# Patient Record
Sex: MALE | Race: WHITE | Employment: OTHER | URBAN - METROPOLITAN AREA
[De-identification: names, ages, dates, MRNs, and addresses within clinical notes are randomized per-mention and may not be internally consistent; named-entity substitution may affect disease eponyms.]

---

## 2017-01-03 ENCOUNTER — ALLIED HEALTH/NURSE VISIT (OUTPATIENT)
Dept: NURSING | Facility: CLINIC | Age: 82
End: 2017-01-03
Payer: MEDICARE

## 2017-01-03 DIAGNOSIS — D51.8 OTHER VITAMIN B12 DEFICIENCY ANEMIA: ICD-10-CM

## 2017-01-03 PROCEDURE — 96372 THER/PROPH/DIAG INJ SC/IM: CPT

## 2017-05-08 ENCOUNTER — ALLIED HEALTH/NURSE VISIT (OUTPATIENT)
Dept: NURSING | Facility: CLINIC | Age: 82
End: 2017-05-08
Payer: MEDICARE

## 2017-05-08 DIAGNOSIS — E53.8 VITAMIN B 12 DEFICIENCY: Primary | ICD-10-CM

## 2017-05-08 PROCEDURE — 96372 THER/PROPH/DIAG INJ SC/IM: CPT

## 2017-06-16 ENCOUNTER — ALLIED HEALTH/NURSE VISIT (OUTPATIENT)
Dept: NURSING | Facility: CLINIC | Age: 82
End: 2017-06-16
Payer: MEDICARE

## 2017-06-16 DIAGNOSIS — E53.8 VITAMIN B 12 DEFICIENCY: Primary | ICD-10-CM

## 2017-06-16 PROCEDURE — 96372 THER/PROPH/DIAG INJ SC/IM: CPT

## 2017-06-16 NOTE — MR AVS SNAPSHOT
"              After Visit Summary   6/16/2017    Aleksandar Decker    MRN: [id number]           Patient Information     Date Of Birth          8/28/1926        Visit Information        Provider Department      6/16/2017 10:30 AM RI JESSICA NURSE [facility]        Today's Diagnoses     Vitamin B 12 deficiency    -  1       Follow-ups after your visit        Your next 10 appointments already scheduled     Oct 12, 2017 10:30 AM CDT   [facility]    [address] [place]  [address]  [facility] [id number] [phone]            Oct 12, 2017 10:30 AM CDT   [facility] ASHLEY Steve   [facility]    [address] [place]  [address]  [facility] [address] [phone]              Who to contact     If you have questions or need follow up information about today's clinic visit or your schedule please contact [facility] directly at [phone].  Normal or non-critical lab and imaging results will be communicated to you by OpenSparkhart, letter or phone within 4 business days after the clinic has received the results. If you do not hear from us within 7 days, please contact the clinic through OpenSparkhart or phone. If you have a critical or abnormal lab result, we will notify you by phone as soon as possible.  Submit refill requests through Criers Podium or call your pharmacy and they will forward the refill request to us. Please allow 3 business days for your refill to be completed.          Additional Information About Your Visit        MyChart Information     Criers Podium lets you send messages to your doctor, view your test results, renew your prescriptions, schedule appointments and more. To sign up, go to [email or web address] . Click on \"Log in\" on the left side of the screen, which will take " "you to the Welcome page. Then click on \"Sign up Now\" on the right side of the page.     You will be asked to enter the access code listed below, as well as some personal information. Please follow the directions to create your username and password.     Your access code is: [email or web address]  Expires: 2017  2:24 PM     Your access code will  in 90 days. If you need help or a new code, please call your [place] clinic or [phone].        Care EveryWhere ID     This is your Care EveryWhere ID. This could be used by other organizations to access your [place] medical records  [id number]         Blood Pressure from Last 3 Encounters:   16 120/64   16 140/70   16 140/70    Weight from Last 3 Encounters:   16 174 lb (78.9 kg)   16 175 lb 14.4 oz (79.8 kg)   16 175 lb 14.4 oz (79.8 kg)              Today, you had the following     No orders found for display       Primary Care Provider Office Phone # Fax #    Uday Lamar [phone]       [facility] [address] [place] [address]        Thank you!     Thank you for choosing [facility]  for your care. Our goal is always to provide you with excellent care. Hearing back from our patients is one way we can continue to improve our services. Please take a few minutes to complete the written survey that you may receive in the mail after your visit with us. Thank you!             Your Updated Medication List - Protect others around you: Learn how to safely use, store and throw away your medicines at www.disposemymeds.org.          This list is accurate as of: 17 11:46 AM.  Always use your most recent med list.                   Brand Name Dispense Instructions for use    cyanocobalamin 1000 MCG/ML injection    VITAMIN B12    1 mL    Inject 1 mL (1,000 mcg) into the muscle every 30 days       donepezil 10 MG tablet    ARICEPT    90 tablet    Take 1 tablet (10 mg) by " mouth At Bedtime       NAMENDA 10 MG tablet   Generic drug:  memantine     180 tablet    Take 10 mg by mouth daily

## 2017-07-14 ENCOUNTER — ALLIED HEALTH/NURSE VISIT (OUTPATIENT)
Dept: NURSING | Facility: CLINIC | Age: 82
End: 2017-07-14
Payer: MEDICARE

## 2017-07-14 DIAGNOSIS — D51.8 OTHER VITAMIN B12 DEFICIENCY ANEMIA: ICD-10-CM

## 2017-07-14 PROCEDURE — 96372 THER/PROPH/DIAG INJ SC/IM: CPT

## 2017-07-28 DIAGNOSIS — Z51.81 ENCOUNTER FOR MONITORING BRIDGING ANTICOAGULATION THERAPY: ICD-10-CM

## 2017-07-28 DIAGNOSIS — C44.90 SKIN CANCER: Primary | ICD-10-CM

## 2017-07-28 DIAGNOSIS — Z79.01 ENCOUNTER FOR MONITORING BRIDGING ANTICOAGULATION THERAPY: ICD-10-CM

## 2017-08-14 ENCOUNTER — ALLIED HEALTH/NURSE VISIT (OUTPATIENT)
Dept: NURSING | Facility: CLINIC | Age: 82
End: 2017-08-14
Payer: MEDICARE

## 2017-08-14 DIAGNOSIS — D51.8 OTHER VITAMIN B12 DEFICIENCY ANEMIA: ICD-10-CM

## 2017-08-14 PROCEDURE — 96372 THER/PROPH/DIAG INJ SC/IM: CPT

## 2017-08-29 ENCOUNTER — OFFICE VISIT (OUTPATIENT)
Dept: INTERNAL MEDICINE | Facility: CLINIC | Age: 82
End: 2017-08-29
Payer: MEDICARE

## 2017-08-29 VITALS
TEMPERATURE: 97.7 F | DIASTOLIC BLOOD PRESSURE: 68 MMHG | SYSTOLIC BLOOD PRESSURE: 136 MMHG | BODY MASS INDEX: 26.85 KG/M2 | OXYGEN SATURATION: 96 % | HEIGHT: 67 IN | WEIGHT: 171.1 LBS | HEART RATE: 69 BPM

## 2017-08-29 DIAGNOSIS — Z23 ENCOUNTER FOR IMMUNIZATION: ICD-10-CM

## 2017-08-29 DIAGNOSIS — E03.9 ACQUIRED HYPOTHYROIDISM: ICD-10-CM

## 2017-08-29 DIAGNOSIS — K21.9 GASTROESOPHAGEAL REFLUX DISEASE WITHOUT ESOPHAGITIS: ICD-10-CM

## 2017-08-29 DIAGNOSIS — F02.80 LATE ONSET ALZHEIMER'S DISEASE WITHOUT BEHAVIORAL DISTURBANCE (H): ICD-10-CM

## 2017-08-29 DIAGNOSIS — R25.1 TREMOR: Primary | ICD-10-CM

## 2017-08-29 DIAGNOSIS — H61.23 BILATERAL IMPACTED CERUMEN: ICD-10-CM

## 2017-08-29 DIAGNOSIS — G30.1 LATE ONSET ALZHEIMER'S DISEASE WITHOUT BEHAVIORAL DISTURBANCE (H): ICD-10-CM

## 2017-08-29 LAB
ERYTHROCYTE [DISTWIDTH] IN BLOOD BY AUTOMATED COUNT: 13.8 % (ref 10–15)
HCT VFR BLD AUTO: 48.9 % (ref 40–53)
HGB BLD-MCNC: 16 G/DL (ref 13.3–17.7)
MCH RBC QN AUTO: 31.5 PG (ref 26.5–33)
MCHC RBC AUTO-ENTMCNC: 32.7 G/DL (ref 31.5–36.5)
MCV RBC AUTO: 96 FL (ref 78–100)
PLATELET # BLD AUTO: 173 10E9/L (ref 150–450)
RBC # BLD AUTO: 5.08 10E12/L (ref 4.4–5.9)
WBC # BLD AUTO: 5.4 10E9/L (ref 4–11)

## 2017-08-29 PROCEDURE — 90662 IIV NO PRSV INCREASED AG IM: CPT | Performed by: INTERNAL MEDICINE

## 2017-08-29 PROCEDURE — 80053 COMPREHEN METABOLIC PANEL: CPT | Performed by: INTERNAL MEDICINE

## 2017-08-29 PROCEDURE — 99214 OFFICE O/P EST MOD 30 MIN: CPT | Mod: 25 | Performed by: INTERNAL MEDICINE

## 2017-08-29 PROCEDURE — G0008 ADMIN INFLUENZA VIRUS VAC: HCPCS | Performed by: INTERNAL MEDICINE

## 2017-08-29 PROCEDURE — 84443 ASSAY THYROID STIM HORMONE: CPT | Performed by: INTERNAL MEDICINE

## 2017-08-29 PROCEDURE — 69210 REMOVE IMPACTED EAR WAX UNI: CPT | Performed by: INTERNAL MEDICINE

## 2017-08-29 PROCEDURE — 36415 COLL VENOUS BLD VENIPUNCTURE: CPT | Performed by: INTERNAL MEDICINE

## 2017-08-29 PROCEDURE — 85027 COMPLETE CBC AUTOMATED: CPT | Performed by: INTERNAL MEDICINE

## 2017-08-29 NOTE — MR AVS SNAPSHOT
After Visit Summary   8/29/2017    Aleksandar Decker    MRN: [id number]           Patient Information     Date Of Birth          8/28/1926        Visit Information        Provider Department      8/29/2017 2:00 PM Uday Lamar MD [facility]        Today's Diagnoses     Tremor    -  1    Late onset Alzheimer's disease without behavioral disturbance        Gastroesophageal reflux disease without esophagitis        Acquired hypothyroidism           Follow-ups after your visit        Additional Services     NEUROLOGY ADULT REFERRAL       Your provider has referred you for the following:   Consult at [facility]: [facility] [phone]   [email or web address]    Please be aware that coverage of these services is subject to the terms and limitations of your health insurance plan.  Call member services at your health plan with any benefit or coverage questions.      Please bring the following with you to your appointment:    (1) Any X-Rays, CTs or MRIs which have been performed.  Contact the facility where they were done to arrange for  prior to your scheduled appointment.    (2) List of current medications  (3) This referral request   (4) Any documents/labs given to you for this referral                  Your next 10 appointments already scheduled     Oct 12, 2017 10:30 AM CDT   Return Visit with LEIA Orozco CNP   [facility]    [address]  [address]  [facility] [id number] [phone]            Oct 12, 2017 10:30 AM CDT   Return Visit with ROSANNE Feliciano   [facility]    [address]  [address]  [facility] [id number] [phone]              Who to contact     If you have questions or need follow up information about  "today's clinic visit or your schedule please contact [facility] directly at [phone].  Normal or non-critical lab and imaging results will be communicated to you by MyChart, letter or phone within 4 business days after the clinic has received the results. If you do not hear from us within 7 days, please contact the clinic through T3 Searchhart or phone. If you have a critical or abnormal lab result, we will notify you by phone as soon as possible.  Submit refill requests through Zephyr or call your pharmacy and they will forward the refill request to us. Please allow 3 business days for your refill to be completed.          Additional Information About Your Visit        MyChart Information     Zephyr lets you send messages to your doctor, view your test results, renew your prescriptions, schedule appointments and more. To sign up, go to [email or web address] . Click on \"Log in\" on the left side of the screen, which will take you to the Welcome page. Then click on \"Sign up Now\" on the right side of the page.     You will be asked to enter the access code listed below, as well as some personal information. Please follow the directions to create your username and password.     Your access code is: [email or web address]  Expires: 2017  3:08 PM     Your access code will  in 90 days. If you need help or a new code, please call your [place] clinic or [phone].        Care EveryWhere ID     This is your Care EveryWhere ID. This could be used by other organizations to access your [place] medical records  [id number]        Your Vitals Were     Pulse Temperature Height Pulse Oximetry BMI (Body Mass Index)       69 97.7  F (36.5  C) (Oral) 5' 7\" (1.702 m) 96% 26.8 kg/m2        Blood Pressure from Last 3 Encounters:   17 136/68   16 120/64   16 140/70    Weight from Last 3 Encounters:   17 171 lb 1.6 oz (77.6 kg)   16 174 lb (78.9 kg)   16 175 lb 14.4 oz (79.8 kg) "              We Performed the Following     CBC with platelets     Comprehensive metabolic panel     NEUROLOGY ADULT REFERRAL     TSH with free T4 reflex        Primary Care Provider Office Phone # Fax #    Uday Lamar [phone]       [address] [facility] [address]        Equal Access to Services     ARASELI BIANKA : Hadii aad ku hadasho Soomaali, waaxda luqadaha, qaybta kaalmada adeegyada, waxay masonin hayelician adefroy sheeba leila . So [facility] [phone].    ATENCIÓN: Si habla español, tiene a jones disposición servicios gratuitos de asistencia lingüística. Llame al [phone].    We comply with applicable federal civil rights laws and [place] laws. We do not discriminate on the basis of race, color, national origin, age, disability sex, sexual orientation or gender identity.            Thank you!     Thank you for choosing [facility]  for your care. Our goal is always to provide you with excellent care. Hearing back from our patients is one way we can continue to improve our services. Please take a few minutes to complete the written survey that you may receive in the mail after your visit with us. Thank you!             Your Updated Medication List - Protect others around you: Learn how to safely use, store and throw away your medicines at www.disposemymeds.org.          This list is accurate as of: 8/29/17  2:42 PM.  Always use your most recent med list.                   Brand Name Dispense Instructions for use Diagnosis    cyanocobalamin 1000 MCG/ML injection    VITAMIN B12    1 mL    Inject 1 mL (1,000 mcg) into the muscle every 30 days        donepezil 10 MG tablet    ARICEPT    90 tablet    Take 1 tablet (10 mg) by mouth At Bedtime    Dementia       NAMENDA 10 MG tablet   Generic drug:  memantine     180 tablet    Take 10 mg by mouth daily        OMEPRAZOLE PO      Take by mouth daily

## 2017-08-29 NOTE — PROGRESS NOTES
SUBJECTIVE:   Aleksandar Decker is a 89 year old male who presents to clinic today for the following health issues:      Patient is seen for a follow up visit.  Here with his wife.   Has h/o dementia. On Namenda and Aricept. No significant change from last visit. Does not know the date. Needs help with medications and daily activities.   Has h/o tremor, right hand, not intentional. Able to eat, dress independently. Slightly progressive symptoms.   Has h/o GERD on PPI  treatment. symptoms are controlled. No nausea, vomiting, heartburns, bloating.  Has h/o hypothyroidism. Not on treatment. Follow up lab work is normal.   Concern for impaired hearing.     Discussed preventive measures.     Problem list and histories reviewed & adjusted, as indicated.  Additional history: as documented    Patient Active Problem List   Diagnosis     Esophageal reflux     CHRONIC LOW BACK PAIN     HYPERLIPIDEMIA LDL GOAL <160     Mild cognitive impairment     Advance Care Planning     Vitamin B 12 deficiency     Hypothyroidism     Dementia of the Alzheimer's type     GERD (gastroesophageal reflux disease)     Past Surgical History:   Procedure Laterality Date     C NONSPECIFIC PROCEDURE      appy      HC COLONOSCOPY THRU STOMA, DIAGNOSTIC      had colonoscopy in [place] 2007 (normal)        Social History   Substance Use Topics     Smoking status: Never Smoker     Smokeless tobacco: Never Used     Alcohol use 0.0 oz/week     0 Standard drinks or equivalent per week      Comment: one drink a day     Family History   Problem Relation Age of Onset     Alcohol/Drug Father      Family History Negative Mother          Current Outpatient Prescriptions   Medication Sig Dispense Refill     OMEPRAZOLE PO Take by mouth daily       donepezil (ARICEPT) 10 MG tablet Take 1 tablet (10 mg) by mouth At Bedtime 90 tablet 3     memantine (NAMENDA) 10 MG tablet Take 10 mg by mouth daily  180 tablet 3     cyanocobalamin 1000 MCG/ML injection Inject 1 mL (1,000  "mcg) into the muscle every 30 days 1 mL 12         Reviewed and updated as needed this visit by clinical staffTobacco  Allergies  Meds  Med Hx  Surg Hx  Fam Hx  Soc Hx      Reviewed and updated as needed this visit by Provider         ROS:  Constitutional, HEENT, cardiovascular, pulmonary, gi and gu systems are negative, except as otherwise noted.      OBJECTIVE:   /68 (BP Location: Left arm, Patient Position: Sitting, Cuff Size: Adult Large)  Pulse 69  Temp 97.7  F (36.5  C) (Oral)  Ht 5' 7\" (1.702 m)  Wt 171 lb 1.6 oz (77.6 kg)  SpO2 96%  BMI 26.8 kg/m2  Body mass index is 26.8 kg/(m^2).   GENERAL: healthy, alert and no distress  EYES: Eyes grossly normal to inspection, PERRL and conjunctivae and sclerae normal  HENT: ear canals - obstructed with brown soft cerumen. Post removal TM's normal, nose and mouth without ulcers or lesions  NECK: no adenopathy, no asymmetry, masses, or scars and thyroid normal to palpation  RESP: lungs clear to auscultation - no rales, rhonchi or wheezes  CV: regular rate and rhythm, normal S1 S2, no S3 or S4, no murmur, click or rub, no peripheral edema and peripheral pulses strong  ABDOMEN: soft, nontender, no hepatosplenomegaly, no masses and bowel sounds normal  MS: no gross musculoskeletal defects noted, no edema  SKIN: no suspicious lesions or rashes  NEURO: Normal strength and tone, mentation intact and speech normal, coarse right hand tremor , improves with intentional movements   PSYCH: mentation appears normal, affect normal/bright    Diagnostic Test Results:  none     ASSESSMENT/PLAN:     Problem List Items Addressed This Visit     Hypothyroidism    Relevant Orders    TSH with free T4 reflex    Dementia of the Alzheimer's type    Relevant Orders    CBC with platelets    Comprehensive metabolic panel    GERD (gastroesophageal reflux disease)    Relevant Medications    OMEPRAZOLE PO      Other Visit Diagnoses     Tremor    -  Primary    Relevant Orders    " NEUROLOGY ADULT REFERRAL    Encounter for immunization        Relevant Orders    FLU VACCINE, INCREASED ANTIGEN, PRESV FREE (Completed)           Assess lab work  Cont treatment  Refer to neurology   Immunized     Cerumen from bilateral ear canals removed with curette     Follow-Up:in 6 months     Uday Lamar MD  [facility]

## 2017-08-29 NOTE — NURSING NOTE
"Chief Complaint   Patient presents with     Follow Up For     6 months       Initial /68 (BP Location: Left arm, Patient Position: Sitting, Cuff Size: Adult Large)  Pulse 69  Temp 97.7  F (36.5  C) (Oral)  Ht 5' 7\" (1.702 m)  Wt 171 lb 1.6 oz (77.6 kg)  SpO2 96%  BMI 26.8 kg/m2 Estimated body mass index is 26.8 kg/(m^2) as calculated from the following:    Height as of this encounter: 5' 7\" (1.702 m).    Weight as of this encounter: 171 lb 1.6 oz (77.6 kg).  Medication Reconciliation: complete   Sharath COSME      "

## 2017-08-30 LAB
ALBUMIN SERPL-MCNC: 3.8 G/DL (ref 3.4–5)
ALP SERPL-CCNC: 85 U/L (ref 40–150)
ALT SERPL W P-5'-P-CCNC: 27 U/L (ref 0–70)
ANION GAP SERPL CALCULATED.3IONS-SCNC: 4 MMOL/L (ref 3–14)
AST SERPL W P-5'-P-CCNC: 24 U/L (ref 0–45)
BILIRUB SERPL-MCNC: 0.5 MG/DL (ref 0.2–1.3)
BUN SERPL-MCNC: 18 MG/DL (ref 7–30)
CALCIUM SERPL-MCNC: 8.6 MG/DL (ref 8.5–10.1)
CHLORIDE SERPL-SCNC: 108 MMOL/L (ref 94–109)
CO2 SERPL-SCNC: 32 MMOL/L (ref 20–32)
CREAT SERPL-MCNC: 1.14 MG/DL (ref 0.66–1.25)
GFR SERPL CREATININE-BSD FRML MDRD: 60 ML/MIN/1.7M2
GLUCOSE SERPL-MCNC: 93 MG/DL (ref 70–99)
POTASSIUM SERPL-SCNC: 4.4 MMOL/L (ref 3.4–5.3)
PROT SERPL-MCNC: 7 G/DL (ref 6.8–8.8)
SODIUM SERPL-SCNC: 144 MMOL/L (ref 133–144)
TSH SERPL DL<=0.005 MIU/L-ACNC: 1.89 MU/L (ref 0.4–4)

## 2017-09-14 ENCOUNTER — ALLIED HEALTH/NURSE VISIT (OUTPATIENT)
Dept: NURSING | Facility: CLINIC | Age: 82
End: 2017-09-14
Payer: MEDICARE

## 2017-09-14 DIAGNOSIS — E53.8 VITAMIN B 12 DEFICIENCY: Primary | ICD-10-CM

## 2017-09-14 PROCEDURE — 96372 THER/PROPH/DIAG INJ SC/IM: CPT

## 2017-09-25 ENCOUNTER — TELEPHONE (OUTPATIENT)
Dept: INTERNAL MEDICINE | Facility: CLINIC | Age: 82
End: 2017-09-25

## 2017-10-12 ENCOUNTER — ALLIED HEALTH/NURSE VISIT (OUTPATIENT)
Dept: NURSING | Facility: CLINIC | Age: 82
End: 2017-10-12
Payer: MEDICARE

## 2017-10-12 DIAGNOSIS — D51.8 OTHER VITAMIN B12 DEFICIENCY ANEMIA: ICD-10-CM

## 2017-10-12 PROCEDURE — 96372 THER/PROPH/DIAG INJ SC/IM: CPT

## 2017-10-24 ENCOUNTER — TELEPHONE (OUTPATIENT)
Dept: INTERNAL MEDICINE | Facility: CLINIC | Age: 82
End: 2017-10-24

## 2017-10-24 NOTE — TELEPHONE ENCOUNTER
Fax received from [facility] for review and signature.  Put in Dr. Lamar's in basket.

## 2017-10-30 ENCOUNTER — TELEPHONE (OUTPATIENT)
Dept: INTERNAL MEDICINE | Facility: CLINIC | Age: 82
End: 2017-10-30

## 2017-10-30 NOTE — TELEPHONE ENCOUNTER
Pt's wife calls, requesting to change pt's primary pharm to [facility] from [facility]. Adjusted pharm preferences. Instructed wife to call new pharm to transfer rxs. Verbalized understanding.

## 2017-10-31 ENCOUNTER — TELEPHONE (OUTPATIENT)
Dept: INTERNAL MEDICINE | Facility: CLINIC | Age: 82
End: 2017-10-31

## 2017-10-31 NOTE — TELEPHONE ENCOUNTER
Fax received from [facility] for review and signature.  Put in Dr. Arteaga's in basket.

## 2017-11-09 ENCOUNTER — ALLIED HEALTH/NURSE VISIT (OUTPATIENT)
Dept: NURSING | Facility: CLINIC | Age: 82
End: 2017-11-09
Payer: MEDICARE

## 2017-11-09 DIAGNOSIS — E53.8 VITAMIN B 12 DEFICIENCY: Primary | ICD-10-CM

## 2017-11-09 PROCEDURE — 96372 THER/PROPH/DIAG INJ SC/IM: CPT

## 2017-12-14 ENCOUNTER — ALLIED HEALTH/NURSE VISIT (OUTPATIENT)
Dept: NURSING | Facility: CLINIC | Age: 82
End: 2017-12-14
Payer: MEDICARE

## 2017-12-14 DIAGNOSIS — D51.8 OTHER VITAMIN B12 DEFICIENCY ANEMIA: ICD-10-CM

## 2017-12-14 DIAGNOSIS — E53.8 VITAMIN B 12 DEFICIENCY: Primary | ICD-10-CM

## 2017-12-14 PROCEDURE — 96372 THER/PROPH/DIAG INJ SC/IM: CPT

## 2018-01-10 ENCOUNTER — ALLIED HEALTH/NURSE VISIT (OUTPATIENT)
Dept: NURSING | Facility: CLINIC | Age: 83
End: 2018-01-10
Payer: MEDICARE

## 2018-01-10 DIAGNOSIS — D51.8 OTHER VITAMIN B12 DEFICIENCY ANEMIA: ICD-10-CM

## 2018-01-10 PROCEDURE — 99207 ZZC NO CHARGE NURSE ONLY: CPT

## 2018-01-10 PROCEDURE — 96372 THER/PROPH/DIAG INJ SC/IM: CPT

## 2018-02-05 DIAGNOSIS — K21.9 GASTROESOPHAGEAL REFLUX DISEASE WITHOUT ESOPHAGITIS: Primary | ICD-10-CM

## 2018-02-08 ENCOUNTER — ALLIED HEALTH/NURSE VISIT (OUTPATIENT)
Dept: NURSING | Facility: CLINIC | Age: 83
End: 2018-02-08
Payer: MEDICARE

## 2018-02-08 DIAGNOSIS — D51.8 OTHER VITAMIN B12 DEFICIENCY ANEMIA: ICD-10-CM

## 2018-02-08 PROCEDURE — 96372 THER/PROPH/DIAG INJ SC/IM: CPT

## 2018-02-14 ENCOUNTER — TELEPHONE (OUTPATIENT)
Dept: INTERNAL MEDICINE | Facility: CLINIC | Age: 83
End: 2018-02-14

## 2018-02-14 NOTE — TELEPHONE ENCOUNTER
Gopal nurse at [facility] calls requesting a current med list signed by the PCP and faxed to him at [phone]. Per Gopal, pt and spouse were living on the independent side and now moved to the assisted living side and will have medications administered by the facility. Please print and have PCP sign. Margy Davila RN

## 2018-02-19 ENCOUNTER — TELEPHONE (OUTPATIENT)
Dept: INTERNAL MEDICINE | Facility: CLINIC | Age: 83
End: 2018-02-19

## 2018-02-27 ENCOUNTER — TELEPHONE (OUTPATIENT)
Dept: INTERNAL MEDICINE | Facility: CLINIC | Age: 83
End: 2018-02-27

## 2018-02-27 NOTE — TELEPHONE ENCOUNTER
Neetu, nurse calling from [facility] to report medication error. Reports just took over medications for patient. Were using bottle and was causing confusion.  Yesterday morning 2/26/18, instead of getting 10 mg Namenda, pt received 20 mg Namenda. 2/25/18 Aricept given BID.  Facility now using Blister packs not bottles anymore.   Patient is doing well, no concerns.  FYI to provider.

## 2018-04-12 ENCOUNTER — MEDICAL CORRESPONDENCE (OUTPATIENT)
Dept: HEALTH INFORMATION MANAGEMENT | Facility: CLINIC | Age: 83
End: 2018-04-12

## 2018-04-12 ENCOUNTER — TELEPHONE (OUTPATIENT)
Dept: INTERNAL MEDICINE | Facility: CLINIC | Age: 83
End: 2018-04-12

## 2018-04-16 RX ORDER — ATORVASTATIN CALCIUM 10 MG/1
TABLET, FILM COATED ORAL
Qty: 30 TABLET | Refills: 3 | OUTPATIENT
Start: 2018-04-16

## 2018-06-04 ENCOUNTER — TRANSFERRED RECORDS (OUTPATIENT)
Dept: HEALTH INFORMATION MANAGEMENT | Facility: CLINIC | Age: 83
End: 2018-06-04

## 2018-08-01 ENCOUNTER — OFFICE VISIT (OUTPATIENT)
Dept: INTERNAL MEDICINE | Facility: CLINIC | Age: 83
End: 2018-08-01
Payer: MEDICARE

## 2018-08-01 VITALS
HEART RATE: 63 BPM | TEMPERATURE: 97.7 F | OXYGEN SATURATION: 94 % | WEIGHT: 165.6 LBS | RESPIRATION RATE: 16 BRPM | DIASTOLIC BLOOD PRESSURE: 80 MMHG | BODY MASS INDEX: 25.99 KG/M2 | HEIGHT: 67 IN | SYSTOLIC BLOOD PRESSURE: 132 MMHG
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
## 2018-08-01 PROCEDURE — 99213 OFFICE O/P EST LOW 20 MIN: CPT | Performed by: NURSE PRACTITIONER

## 2018-08-01 PROCEDURE — 86618 LYME DISEASE ANTIBODY: CPT | Performed by: NURSE PRACTITIONER

## 2018-08-01 PROCEDURE — 36415 COLL VENOUS BLD VENIPUNCTURE: CPT | Performed by: NURSE PRACTITIONER

## 2018-08-01 RX ORDER — DOXYCYCLINE 100 MG/1
100 CAPSULE ORAL 2 TIMES DAILY
Qty: 14 CAPSULE | Refills: 0 | Status: SHIPPED | OUTPATIENT
Start: 2018-08-01 | End: 2018-09-25

## 2018-08-01 NOTE — MR AVS SNAPSHOT
"              After Visit Summary   8/1/2018    Aleksandar Decker    MRN: [id number]           Patient Information     Date Of Birth          8/28/1926        Visit Information        Provider Department      8/1/2018 12:40 PM Winter Toscano NP [facility]        Today's Diagnoses     Bug bite, initial encounter    -  1       Follow-ups after your visit        Who to contact     If you have questions or need follow up information about today's clinic visit or your schedule please contact [facility] directly at [phone].  Normal or non-critical lab and imaging results will be communicated to you by MyChart, letter or phone within 4 business days after the clinic has received the results. If you do not hear from us within 7 days, please contact the clinic through MyChart or phone. If you have a critical or abnormal lab result, we will notify you by phone as soon as possible.  Submit refill requests through [employer] or call your pharmacy and they will forward the refill request to us. Please allow 3 business days for your refill to be completed.          Additional Information About Your Visit        Care EveryWhere ID     This is your Care EveryWhere ID. This could be used by other organizations to access your [place] medical records  [id number]        Your Vitals Were     Pulse Temperature Respirations Height Pulse Oximetry BMI (Body Mass Index)    63 97.7  F (36.5  C) (Oral) 16 5' 7\" (1.702 m) 94% 25.94 kg/m2       Blood Pressure from Last 3 Encounters:   08/01/18 132/80   08/29/17 136/68   09/06/16 120/64    Weight from Last 3 Encounters:   08/01/18 165 lb 9.6 oz (75.1 kg)   08/29/17 171 lb 1.6 oz (77.6 kg)   09/06/16 174 lb (78.9 kg)              We Performed the Following     Lyme Disease Maday with reflex to WB Serum          Today's Medication Changes          These changes are accurate as of 8/1/18  1:26 PM.  If you have any questions, ask your nurse or doctor.          "      Start taking these medicines.        Dose/Directions    doxycycline 100 MG capsule   Commonly known as:  VIBRAMYCIN   Used for:  Bug bite, initial encounter   Started by:  Winter Toscano NP        Dose:  100 mg   Take 1 capsule (100 mg) by mouth 2 times daily   Quantity:  14 capsule   Refills:  0            Where to get your medicines      These medications were sent to [facility] [place], [place] - [address]  [address] [facility] [address]     Phone:  [phone]     doxycycline 100 MG capsule                Primary Care Provider Office Phone # Fax #    Uday Lamar [phone]       [address] [place]  [facility] [address]        Equal Access to Services     MEE CARLTON : Hadii cj sanchez hadasho Soomaali, waaxda luqadaha, qaybta kaalmada adeegyada, marcel dee . So [facility] [phone].    ATENCIÓN: Si habla español, tiene a jones disposición servicios gratuitos de asistencia lingüística. Ll[facility] [phone].    We comply with applicable federal civil rights laws and [place] laws. We do not discriminate on the basis of race, color, national origin, age, disability, sex, sexual orientation, or gender identity.            Thank you!     Thank you for choosing [facility]  for your care. Our goal is always to provide you with excellent care. Hearing back from our patients is one way we can continue to improve our services. Please take a few minutes to complete the written survey that you may receive in the mail after your visit with us. Thank you!             Your Updated Medication List - Protect others around you: Learn how to safely use, store and throw away your medicines at www.disposemymeds.org.          This list is accurate as of 8/1/18  1:26 PM.  Always use your most recent med list.                   Brand Name Dispense Instructions for use Diagnosis    cyanocobalamin 1000 MCG/ML injection     VITAMIN B12    1 mL    Inject 1 mL (1,000 mcg) into the muscle every 30 days        donepezil 10 MG tablet    ARICEPT    90 tablet    Take 1 tablet (10 mg) by mouth At Bedtime    Dementia       doxycycline 100 MG capsule    VIBRAMYCIN    14 capsule    Take 1 capsule (100 mg) by mouth 2 times daily    Bug bite, initial encounter       NAMENDA 10 MG tablet   Generic drug:  memantine     180 tablet    Take 10 mg by mouth daily        omeprazole 20 MG CR capsule    priLOSEC    90 capsule    TAKE ONE CAPSULE BY MOUTH DAILY    Gastroesophageal reflux disease without esophagitis

## 2018-08-01 NOTE — PROGRESS NOTES
SUBJECTIVE:   Aleksandar Decker is a 89 year old male who presents to clinic today for the following health issues:      Rash      Duration: itchy x 1 week, noticed red area x2 last night    Description  Location: back  Itching: mild    Intensity:  moderate    Accompanying signs and symptoms: None    History (similar episodes/previous evaluation): None    Precipitating or alleviating factors:  New exposures:  [place] no known spider, bug or tick bites  Recent travel: see above     Therapies tried and outcome: none          Problem list and histories reviewed & adjusted, as indicated.  Additional history: as documented    Patient Active Problem List   Diagnosis     Esophageal reflux     CHRONIC LOW BACK PAIN     HYPERLIPIDEMIA LDL GOAL <160     Mild cognitive impairment     Advance Care Planning     Vitamin B 12 deficiency     Hypothyroidism     Dementia of the Alzheimer's type     GERD (gastroesophageal reflux disease)     Past Surgical History:   Procedure Laterality Date     C NONSPECIFIC PROCEDURE      appy      HC COLONOSCOPY THRU STOMA, DIAGNOSTIC      had colonoscopy in [place] 2007 (normal)        Social History   Substance Use Topics     Smoking status: Never Smoker     Smokeless tobacco: Never Used     Alcohol use 0.0 oz/week     0 Standard drinks or equivalent per week      Comment: 1-2 drinks a week maybe     Family History   Problem Relation Age of Onset     Alcohol/Drug Father      Family History Negative Mother          Current Outpatient Prescriptions   Medication Sig Dispense Refill     cyanocobalamin 1000 MCG/ML injection Inject 1 mL (1,000 mcg) into the muscle every 30 days 1 mL 12     donepezil (ARICEPT) 10 MG tablet Take 1 tablet (10 mg) by mouth At Bedtime 90 tablet 3     doxycycline (VIBRAMYCIN) 100 MG capsule Take 1 capsule (100 mg) by mouth 2 times daily 14 capsule 0     memantine (NAMENDA) 10 MG tablet Take 10 mg by mouth daily  180 tablet 3     omeprazole (PRILOSEC)  "20 MG CR capsule TAKE ONE CAPSULE BY MOUTH DAILY 90 capsule 2     BP Readings from Last 3 Encounters:   08/01/18 132/80   08/29/17 136/68   09/06/16 120/64    Wt Readings from Last 3 Encounters:   08/01/18 165 lb 9.6 oz (75.1 kg)   08/29/17 171 lb 1.6 oz (77.6 kg)   09/06/16 174 lb (78.9 kg)                    Reviewed and updated as needed this visit by clinical staff  Tobacco  Allergies  Meds  Med Hx  Surg Hx  Fam Hx  Soc Hx      Reviewed and updated as needed this visit by Provider         ROS:  CONSTITUTIONAL: NEGATIVE for fever, chills, change in weight  ENT/MOUTH: NEGATIVE for ear, mouth and throat problems  RESP: NEGATIVE for significant cough or SOB  CV: NEGATIVE for chest pain, palpitations or peripheral edema    OBJECTIVE:     /80 (BP Location: Right arm, Patient Position: Sitting, Cuff Size: Adult Large)  Pulse 63  Temp 97.7  F (36.5  C) (Oral)  Resp 16  Ht 5' 7\" (1.702 m)  Wt 165 lb 9.6 oz (75.1 kg)  SpO2 94%  BMI 25.94 kg/m2  Body mass index is 25.94 kg/(m^2).  GENERAL: alert, no distress, frail and elderly  SKIN: erythema - R posterior shoulder, 2 cm, and L infrascapular, 4 cm, red patches with central puncture marks, no discharge.        ASSESSMENT/PLAN:               ICD-10-CM    1. Bug bite, initial encounter W57.XXXA doxycycline (VIBRAMYCIN) 100 MG capsule     Lyme Disease Maday with reflex to WB Serum       NSAIDs prn    Winter Toscano NP  [facility]    "

## 2018-08-02 ENCOUNTER — TELEPHONE (OUTPATIENT)
Dept: INTERNAL MEDICINE | Facility: CLINIC | Age: 83
End: 2018-08-02

## 2018-08-02 LAB — B BURGDOR IGG+IGM SER QL: 0.08 (ref 0–0.89)

## 2018-08-02 NOTE — TELEPHONE ENCOUNTER
Please advise pt Lymes titer negative.  Finish the doxycycline for spider bites/infection  Winter Toscano CNP

## 2018-08-09 ENCOUNTER — TELEPHONE (OUTPATIENT)
Dept: INTERNAL MEDICINE | Facility: CLINIC | Age: 83
End: 2018-08-09

## 2018-09-25 ENCOUNTER — OFFICE VISIT (OUTPATIENT)
Dept: INTERNAL MEDICINE | Facility: CLINIC | Age: 83
End: 2018-09-25
Payer: MEDICARE

## 2018-09-25 VITALS
WEIGHT: 165 LBS | OXYGEN SATURATION: 93 % | DIASTOLIC BLOOD PRESSURE: 80 MMHG | SYSTOLIC BLOOD PRESSURE: 138 MMHG | TEMPERATURE: 97.9 F | HEIGHT: 67 IN | HEART RATE: 64 BPM | BODY MASS INDEX: 25.9 KG/M2

## 2018-09-25 DIAGNOSIS — Z00.00 ROUTINE GENERAL MEDICAL EXAMINATION AT A HEALTH CARE FACILITY: Primary | ICD-10-CM

## 2018-09-25 DIAGNOSIS — E53.8 VITAMIN B 12 DEFICIENCY: ICD-10-CM

## 2018-09-25 DIAGNOSIS — G30.1 LATE ONSET ALZHEIMER'S DISEASE WITHOUT BEHAVIORAL DISTURBANCE (H): ICD-10-CM

## 2018-09-25 DIAGNOSIS — E03.9 ACQUIRED HYPOTHYROIDISM: ICD-10-CM

## 2018-09-25 DIAGNOSIS — F02.80 LATE ONSET ALZHEIMER'S DISEASE WITHOUT BEHAVIORAL DISTURBANCE (H): ICD-10-CM

## 2018-09-25 DIAGNOSIS — Z12.5 ENCOUNTER FOR SCREENING FOR MALIGNANT NEOPLASM OF PROSTATE: ICD-10-CM

## 2018-09-25 DIAGNOSIS — E55.9 VITAMIN D DEFICIENCY: ICD-10-CM

## 2018-09-25 LAB
ALBUMIN UR-MCNC: NEGATIVE MG/DL
APPEARANCE UR: CLEAR
BILIRUB UR QL STRIP: NEGATIVE
COLOR UR AUTO: YELLOW
ERYTHROCYTE [DISTWIDTH] IN BLOOD BY AUTOMATED COUNT: 13.4 % (ref 10–15)
GLUCOSE UR STRIP-MCNC: NEGATIVE MG/DL
HCT VFR BLD AUTO: 52.9 % (ref 40–53)
HGB BLD-MCNC: 17.4 G/DL (ref 13.3–17.7)
HGB UR QL STRIP: NEGATIVE
KETONES UR STRIP-MCNC: NEGATIVE MG/DL
LEUKOCYTE ESTERASE UR QL STRIP: NEGATIVE
MCH RBC QN AUTO: 32 PG (ref 26.5–33)
MCHC RBC AUTO-ENTMCNC: 32.9 G/DL (ref 31.5–36.5)
MCV RBC AUTO: 97 FL (ref 78–100)
NITRATE UR QL: NEGATIVE
PH UR STRIP: 6.5 PH (ref 5–7)
PLATELET # BLD AUTO: 170 10E9/L (ref 150–450)
RBC # BLD AUTO: 5.43 10E12/L (ref 4.4–5.9)
SOURCE: NORMAL
SP GR UR STRIP: 1.01 (ref 1–1.03)
UROBILINOGEN UR STRIP-ACNC: 0.2 EU/DL (ref 0.2–1)
VIT B12 SERPL-MCNC: 641 PG/ML (ref 193–986)
WBC # BLD AUTO: 4.6 10E9/L (ref 4–11)

## 2018-09-25 PROCEDURE — 80061 LIPID PANEL: CPT | Performed by: INTERNAL MEDICINE

## 2018-09-25 PROCEDURE — G0439 PPPS, SUBSEQ VISIT: HCPCS | Performed by: INTERNAL MEDICINE

## 2018-09-25 PROCEDURE — 80053 COMPREHEN METABOLIC PANEL: CPT | Performed by: INTERNAL MEDICINE

## 2018-09-25 PROCEDURE — 84443 ASSAY THYROID STIM HORMONE: CPT | Performed by: INTERNAL MEDICINE

## 2018-09-25 PROCEDURE — G0103 PSA SCREENING: HCPCS | Performed by: INTERNAL MEDICINE

## 2018-09-25 PROCEDURE — 82607 VITAMIN B-12: CPT | Performed by: INTERNAL MEDICINE

## 2018-09-25 PROCEDURE — 36415 COLL VENOUS BLD VENIPUNCTURE: CPT | Performed by: INTERNAL MEDICINE

## 2018-09-25 PROCEDURE — 82306 VITAMIN D 25 HYDROXY: CPT | Performed by: INTERNAL MEDICINE

## 2018-09-25 PROCEDURE — 81003 URINALYSIS AUTO W/O SCOPE: CPT | Performed by: INTERNAL MEDICINE

## 2018-09-25 PROCEDURE — 85027 COMPLETE CBC AUTOMATED: CPT | Performed by: INTERNAL MEDICINE

## 2018-09-25 NOTE — MR AVS SNAPSHOT
After Visit Summary   9/25/2018    Aleksandar Decker    MRN: [id number]           Patient Information     Date Of Birth          8/28/1926        Visit Information        Provider Department      9/25/2018 9:00 AM Uday Lamar MD [facility]        Today's Diagnoses     Routine general medical examination at a health care facility    -  1    Acquired hypothyroidism        Vitamin B 12 deficiency        Late onset Alzheimer's disease without behavioral disturbance        Encounter for screening for malignant neoplasm of prostate         Vitamin D deficiency          Care Instructions      Preventive Health Recommendations:       Male Ages 65 and over    Yearly exam:             See your health care provider every year in order to  o   Review health changes.   o   Discuss preventive care.    o   Review your medicines if your doctor has prescribed any.    Talk with your health care provider about whether you should have a test to screen for prostate cancer (PSA).    Every 3 years, have a diabetes test (fasting glucose). If you are at risk for diabetes, you should have this test more often.    Every 5 years, have a cholesterol test. Have this test more often if you are at risk for high cholesterol or heart disease.     Every 10 years, have a colonoscopy. Or, have a yearly FIT test (stool test). These exams will check for colon cancer.    Talk to with your health care provider about screening for Abdominal Aortic Aneurysm if you have a family history of AAA or have a history of smoking.  Shots:     Get a flu shot each year.     Get a tetanus shot every 10 years.     Talk to your doctor about your pneumonia vaccines. There are now two you should receive - Pneumovax (PPSV 23) and Prevnar (PCV 13).    Talk to your pharmacist about a shingles vaccine.     Talk to your doctor about the hepatitis B vaccine.  Nutrition:     Eat at least 5 servings of fruits and vegetables each day.     Eat  "whole-grain bread, whole-wheat pasta and brown rice instead of white grains and rice.     Get adequate Calcium and Vitamin D.   Lifestyle    Exercise for at least 150 minutes a week (30 minutes a day, 5 days a week). This will help you control your weight and prevent disease.     Limit alcohol to one drink per day.     No smoking.     Wear sunscreen to prevent skin cancer.     See your dentist every six months for an exam and cleaning.     See your eye doctor every 1 to 2 years to screen for conditions such as glaucoma, macular degeneration and cataracts.          Follow-ups after your visit        Who to contact     If you have questions or need follow up information about today's clinic visit or your schedule please contact [facility] directly at [phone].  Normal or non-critical lab and imaging results will be communicated to you by MyChart, letter or phone within 4 business days after the clinic has received the results. If you do not hear from us within 7 days, please contact the clinic through MyChart or phone. If you have a critical or abnormal lab result, we will notify you by phone as soon as possible.  Submit refill requests through [employer] or call your pharmacy and they will forward the refill request to us. Please allow 3 business days for your refill to be completed.          Additional Information About Your Visit        Care EveryWhere ID     This is your Care EveryWhere ID. This could be used by other organizations to access your [facility] medical records  [id number]        Your Vitals Were     Pulse Temperature Height Pulse Oximetry BMI (Body Mass Index)       64 97.9  F (36.6  C) (Oral) 5' 7\" (1.702 m) 93% 25.84 kg/m2        Blood Pressure from Last 3 Encounters:   09/25/18 138/80   08/01/18 132/80   08/29/17 136/68    Weight from Last 3 Encounters:   09/25/18 165 lb (74.8 kg)   08/01/18 165 lb 9.6 oz (75.1 kg)   08/29/17 171 lb 1.6 oz (77.6 kg)              We Performed the " Following     *UA reflex to Microscopic     CBC with platelets     Comprehensive metabolic panel     Lipid panel reflex to direct LDL Fasting     Prostate spec antigen screen     TSH with free T4 reflex     Vitamin B12     Vitamin D Deficiency        Primary Care Provider Office Phone # Fax #    Uday Lamar [phone]       [address] [facility] [address]        Equal Access to Services     [facility]ELI [facility]HEL : Hadii aad ku hadasho Soomaali, waaxda luqadaha, qaybta kaalmada adeegyada, waxay masonin hayaan adeeg khjuanyholly lajavin . So [facility] [phone].    ATENCIÓN: Si habla español, tiene a jones disposición servicios gratuitos de asistencia lingüística. Llame al [phone].    We comply with applicable federal civil rights laws and [place] laws. We do not discriminate on the basis of race, color, national origin, age, disability, sex, sexual orientation, or gender identity.            Thank you!     Thank you for choosing [facility]  for your care. Our goal is always to provide you with excellent care. Hearing back from our patients is one way we can continue to improve our services. Please take a few minutes to complete the written survey that you may receive in the mail after your visit with us. Thank you!             Your Updated Medication List - Protect others around you: Learn how to safely use, store and throw away your medicines at www.disposemymeds.org.          This list is accurate as of 9/25/18  9:39 AM.  Always use your most recent med list.                   Brand Name Dispense Instructions for use Diagnosis    cyanocobalamin 1000 MCG/ML injection    VITAMIN B12    1 mL    Inject 1 mL (1,000 mcg) into the muscle every 30 days        donepezil 10 MG tablet    ARICEPT    90 tablet    Take 1 tablet (10 mg) by mouth At Bedtime    Dementia       NAMENDA 10 MG tablet   Generic drug:  memantine     180 tablet    Take 10 mg by mouth daily        omeprazole 20 MG CR  capsule    priLOSEC    90 capsule    TAKE ONE CAPSULE BY MOUTH DAILY    Gastroesophageal reflux disease without esophagitis

## 2018-09-25 NOTE — PROGRESS NOTES
SUBJECTIVE:   Aleksandar Decker is a 89 year old male who presents for Preventive Visit.      Are you in the first 12 months of your Medicare Part B coverage?  No    Healthy Habits:    Do you get at least three servings of calcium containing foods daily (dairy, green leafy vegetables, etc.)? yes    Amount of exercise or daily activities, outside of work: daily walk    Problems taking medications regularly No    Medication side effects: No    Have you had an eye exam in the past two years? yes    Do you see a dentist twice per year? yes    Do you have sleep apnea, excessive snoring or daytime drowsiness?no      Ability to successfully perform activities of daily living: Yes, no assistance needed ( according to Pt)    Home safety:  none identified     Hearing impairment: Yes, Need to ask people to speak up or repeat themselves.    Difficulty understanding soft or whispered speech.    Fall risk:  Fallen 2 or more times in the past year?: No  Any fall with injury in the past year?: No        COGNITIVE SCREEN  1) Repeat 3 items (Leader, Season, Table)    2) Clock draw: normal clock, abnormal time  3) 3 item recall: Recalls NO objects   Results: ABNORMAL clock time and recalls no objects  PROBABLE COGNITIVE IMPAIRMENT, **INFORM PROVIDER**    Mini-CogTM Copyright S Adolfo. Licensed by the author for use in [facility]; reprinted with permission (soob@.[facility]). All rights reserved.            PROBLEMS TO ADD ON...  No acute complaints, no medication change or new medical conditions.  Has h/o dementia. Follows with neurology. On treatment. Slow progression. Lives with wife in assisted living.       Reviewed and updated as needed this visit by clinical staff         Reviewed and updated as needed this visit by Provider        Social History   Substance Use Topics     Smoking status: Never Smoker     Smokeless tobacco: Never Used     Alcohol use 0.0 oz/week     0 Standard drinks or equivalent per week      Comment:  1-2 drinks a week maybe       If you drink alcohol do you typically have >3 drinks per day or >7 drinks per week? No                        Today's PHQ-2 Score:   PHQ-2 ( 1999 [employer]) 8/1/2018 8/29/2017   Q1: Little interest or pleasure in doing things 0 0   Q2: Feeling down, depressed or hopeless 0 0   PHQ-2 Score 0 0       Do you feel safe in your environment - Yes    Do you have a Health Care Directive?: Yes: Advance Directive has been received and scanned.    Current providers sharing in care for this patient include:   Patient Care Team:  Uday Lamar MD as PCP - General (Internal Medicine)  Amy Dos Santos APRN CNP as Nurse Practitioner (Nurse Practitioner)    The following health maintenance items are reviewed in Epic and correct as of today:  Health Maintenance   Topic Date Due     INFLUENZA VACCINE (1) 09/01/2018     FALL RISK ASSESSMENT  08/01/2019     PHQ-2 Q1 YR  08/01/2019     ADVANCE DIRECTIVE PLANNING Q5 YRS  11/03/2021     PNEUMOCOCCAL  Completed     Labs reviewed in EPIC  BP Readings from Last 3 Encounters:   09/25/18 138/80   08/01/18 132/80   08/29/17 136/68    Wt Readings from Last 3 Encounters:   09/25/18 165 lb (74.8 kg)   08/01/18 165 lb 9.6 oz (75.1 kg)   08/29/17 171 lb 1.6 oz (77.6 kg)                        ROS:  CONSTITUTIONAL: NEGATIVE for fever, chills, change in weight  INTEGUMENTARY/SKIN: NEGATIVE for worrisome rashes, moles or lesions  EYES: NEGATIVE for vision changes or irritation  ENT/MOUTH: NEGATIVE for ear, mouth and throat problems  RESP: NEGATIVE for significant cough or SOB  BREAST: NEGATIVE for masses, tenderness or discharge  CV: NEGATIVE for chest pain, palpitations or peripheral edema  GI: NEGATIVE for nausea, abdominal pain, heartburn, or change in bowel habits  : NEGATIVE for frequency, dysuria, or hematuria  MUSCULOSKELETAL: NEGATIVE for significant arthralgias or myalgia  NEURO: NEGATIVE for weakness, dizziness or paresthesias  ENDOCRINE: NEGATIVE  "for temperature intolerance, skin/hair changes  HEME: NEGATIVE for bleeding problems  PSYCHIATRIC: NEGATIVE for changes in mood or affect    OBJECTIVE:   Pulse 64  Temp 97.9  F (36.6  C) (Oral)  Ht 5' 7\" (1.702 m)  Wt 165 lb (74.8 kg)  SpO2 93%  BMI 25.84 kg/m2 Estimated body mass index is 25.84 kg/(m^2) as calculated from the following:    Height as of this encounter: 5' 7\" (1.702 m).    Weight as of this encounter: 165 lb (74.8 kg).  EXAM:   GENERAL: healthy, alert and no distress  EYES: Eyes grossly normal to inspection, PERRL and conjunctivae and sclerae normal  HENT: ear canals and TM's normal, nose and mouth without ulcers or lesions  NECK: no adenopathy, no asymmetry, masses, or scars and thyroid normal to palpation  RESP: lungs clear to auscultation - no rales, rhonchi or wheezes  CV: regular rate and rhythm, normal S1 S2, no S3 or S4, no murmur, click or rub, no peripheral edema and peripheral pulses strong  ABDOMEN: soft, nontender, no hepatosplenomegaly, no masses and bowel sounds normal  MS: no gross musculoskeletal defects noted, no edema  SKIN: no suspicious lesions or rashes  NEURO: Normal strength and tone,  speech normal, right arm tremor   PSYCH: mentation appears normal, affect normal, does not know the date, time, medications.     Diagnostic Test Results:  none     ASSESSMENT / PLAN:       ICD-10-CM    1. Routine general medical examination at a health care facility Z00.00 CBC with platelets     Comprehensive metabolic panel     Lipid panel reflex to direct LDL Fasting     TSH with free T4 reflex     Prostate spec antigen screen     *UA reflex to Microscopic     Vitamin B12     Vitamin D Deficiency   2. Acquired hypothyroidism E03.9 TSH with free T4 reflex   3. Vitamin B 12 deficiency E53.8 Vitamin B12   4. 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 Encounter for screening for malignant neoplasm of prostate  Z12.5 Prostate spec antigen screen   6. " "Vitamin D deficiency E55.9 Vitamin D Deficiency       End of Life Planning:  Patient currently has an advanced directive: Yes.  Practitioner is supportive of decision.    COUNSELING:  Reviewed preventive health counseling, as reflected in patient instructions       Regular exercise       Healthy diet/nutrition       Vision screening       Hearing screening    BP Readings from Last 1 Encounters:   08/01/18 132/80     Estimated body mass index is 25.84 kg/(m^2) as calculated from the following:    Height as of this encounter: 5' 7\" (1.702 m).    Weight as of this encounter: 165 lb (74.8 kg).           reports that he has never smoked. He has never used smokeless tobacco.      Appropriate preventive services were discussed with this patient, including applicable screening as appropriate for cardiovascular disease, diabetes, osteopenia/osteoporosis, and glaucoma.  As appropriate for age/gender, discussed screening for colorectal cancer, prostate cancer, breast cancer, and cervical cancer. Checklist reviewing preventive services available has been given to the patient.    Reviewed patients plan of care and provided an AVS. The Intermediate Care Plan ( asthma action plan, low back pain action plan, and migraine action plan) for Aleksandar meets the Care Plan requirement. This Care Plan has been established and reviewed with the Patient.    Counseling Resources:  ATP IV Guidelines  Pooled Cohorts Equation Calculator  Breast Cancer Risk Calculator  FRAX Risk Assessment  ICSI Preventive Guidelines  Dietary Guidelines for Americans, 2010  USDA's MyPlate  ASA Prophylaxis  Lung CA Screening    Uday Lamar MD  [facility]  "

## 2018-09-25 NOTE — NURSING NOTE
"Vital signs:  Temp: 97.9  F (36.6  C) Temp src: Oral BP: 138/80 Pulse: 64     SpO2: 93 %     Height: 5' 7\" (170.2 cm) Weight: 165 lb (74.8 kg)  Estimated body mass index is 25.84 kg/(m^2) as calculated from the following:    Height as of this encounter: 5' 7\" (1.702 m).    Weight as of this encounter: 165 lb (74.8 kg).          "

## 2018-09-25 NOTE — LETTER
September 26, 2018      Aleksandar Decker  [address]   [place] [facility] [place] [facility] [address]        Dear ,    Your lab results came back within acceptable limits except for an elevated cholesterol. Recommendations are to eat healthy and exercise regularly.    Resulted Orders   CBC with platelets   Result Value Ref Range    WBC 4.6 4.0 - 11.0 10e9/L    RBC Count 5.43 4.4 - 5.9 10e12/L    Hemoglobin 17.4 13.3 - 17.7 g/dL    Hematocrit 52.9 40.0 - 53.0 %    MCV 97 78 - 100 fl    MCH 32.0 26.5 - 33.0 pg    MCHC 32.9 31.5 - 36.5 g/dL    RDW 13.4 10.0 - 15.0 %    Platelet Count 170 150 - 450 10e9/L   Comprehensive metabolic panel   Result Value Ref Range    Sodium 141 133 - 144 mmol/L    Potassium 4.6 3.4 - 5.3 mmol/L    Chloride 104 94 - 109 mmol/L    Carbon Dioxide 28 20 - 32 mmol/L    Anion Gap 9 3 - 14 mmol/L    Glucose 94 70 - 99 mg/dL      Comment:      Fasting specimen    Urea Nitrogen 11 7 - 30 mg/dL    Creatinine 1.13 0.66 - 1.25 mg/dL    GFR Estimate 61 >60 mL/min/1.7m2      Comment:      Non  GFR Calc    GFR Estimate If Black 73 >60 mL/min/1.7m2      Comment:       GFR Calc    Calcium 9.2 8.5 - 10.1 mg/dL    Bilirubin Total 0.9 0.2 - 1.3 mg/dL    Albumin 3.9 3.4 - 5.0 g/dL    Protein Total 7.2 6.8 - 8.8 g/dL    Alkaline Phosphatase 75 40 - 150 U/L    ALT 16 0 - 70 U/L    AST 19 0 - 45 U/L   Lipid panel reflex to direct LDL Fasting   Result Value Ref Range    Cholesterol 247 (H) <200 mg/dL      Comment:      Desirable:       <200 mg/dl    Triglycerides 177 (H) <150 mg/dL      Comment:      Borderline high:  150-199 mg/dl  High:             200-499 mg/dl  Very high:       >499 mg/dl  Fasting specimen      HDL Cholesterol 44 >39 mg/dL    LDL Cholesterol Calculated 168 (H) <100 mg/dL      Comment:      Above desirable:  100-129 mg/dl  Borderline High:  130-159 mg/dL  High:             160-189 mg/dL  Very high:       >189 mg/dl      Non HDL Cholesterol 203 (H) <130 mg/dL       Comment:      Above Desirable:  130-159 mg/dl  Borderline high:  160-189 mg/dl  High:             190-219 mg/dl  Very high:       >219 mg/dl     TSH with free T4 reflex   Result Value Ref Range    TSH 3.02 0.40 - 4.00 mU/L   Prostate spec antigen screen   Result Value Ref Range    PSA 3.16 0 - 4 ug/L      Comment:      Assay Method:  Chemiluminescence using Siemens Vista analyzer   *UA reflex to Microscopic   Result Value Ref Range    Color Urine Yellow     Appearance Urine Clear     Glucose Urine Negative NEG^Negative mg/dL    Bilirubin Urine Negative NEG^Negative    Ketones Urine Negative NEG^Negative mg/dL    Specific Gravity Urine 1.015 1.003 - 1.035    Blood Urine Negative NEG^Negative    pH Urine 6.5 5.0 - 7.0 pH    Protein Albumin Urine Negative NEG^Negative mg/dL    Urobilinogen Urine 0.2 0.2 - 1.0 EU/dL    Nitrite Urine Negative NEG^Negative    Leukocyte Esterase Urine Negative NEG^Negative    Source Midstream Urine    Vitamin B12   Result Value Ref Range    Vitamin B12 641 193 - 986 pg/mL   Vitamin D Deficiency   Result Value Ref Range    Vitamin D Deficiency screening 28 20 - 75 ug/L      Comment:      Season, race, dietary intake, and treatment affect the concentration of   25-hydroxy-Vitamin D. Values may decrease during winter months and increase   during summer months. Values 20-29 ug/L may indicate Vitamin D insufficiency   and values <20 ug/L may indicate Vitamin D deficiency.  Vitamin D determination is routinely performed by an immunoassay specific for   25 hydroxyvitamin D3.  If an individual is on vitamin D2 (ergocalciferol)   supplementation, please specify 25 OH vitamin D2 and D3 level determination by   LCMSMS test [id number].         If you have any questions or concerns, please call the clinic at the number listed above.       Sincerely,        Uday Lamar MD

## 2018-09-26 LAB
ALBUMIN SERPL-MCNC: 3.9 G/DL (ref 3.4–5)
ALP SERPL-CCNC: 75 U/L (ref 40–150)
ALT SERPL W P-5'-P-CCNC: 16 U/L (ref 0–70)
ANION GAP SERPL CALCULATED.3IONS-SCNC: 9 MMOL/L (ref 3–14)
AST SERPL W P-5'-P-CCNC: 19 U/L (ref 0–45)
BILIRUB SERPL-MCNC: 0.9 MG/DL (ref 0.2–1.3)
BUN SERPL-MCNC: 11 MG/DL (ref 7–30)
CALCIUM SERPL-MCNC: 9.2 MG/DL (ref 8.5–10.1)
CHLORIDE SERPL-SCNC: 104 MMOL/L (ref 94–109)
CHOLEST SERPL-MCNC: 247 MG/DL
CO2 SERPL-SCNC: 28 MMOL/L (ref 20–32)
CREAT SERPL-MCNC: 1.13 MG/DL (ref 0.66–1.25)
DEPRECATED CALCIDIOL+CALCIFEROL SERPL-MC: 28 UG/L (ref 20–75)
GFR SERPL CREATININE-BSD FRML MDRD: 61 ML/MIN/1.7M2
GLUCOSE SERPL-MCNC: 94 MG/DL (ref 70–99)
HDLC SERPL-MCNC: 44 MG/DL
LDLC SERPL CALC-MCNC: 168 MG/DL
NONHDLC SERPL-MCNC: 203 MG/DL
POTASSIUM SERPL-SCNC: 4.6 MMOL/L (ref 3.4–5.3)
PROT SERPL-MCNC: 7.2 G/DL (ref 6.8–8.8)
PSA SERPL-ACNC: 3.16 UG/L (ref 0–4)
SODIUM SERPL-SCNC: 141 MMOL/L (ref 133–144)
TRIGL SERPL-MCNC: 177 MG/DL
TSH SERPL DL<=0.005 MIU/L-ACNC: 3.02 MU/L (ref 0.4–4)

## 2019-04-23 ENCOUNTER — RECORDS - HEALTHEAST (OUTPATIENT)
Dept: LAB | Facility: CLINIC | Age: 84
End: 2019-04-23

## 2019-04-23 LAB
ANION GAP SERPL CALCULATED.3IONS-SCNC: 6 MMOL/L (ref 5–18)
BUN SERPL-MCNC: 11 MG/DL (ref 8–28)
CALCIUM SERPL-MCNC: 9.4 MG/DL (ref 8.5–10.5)
CHLORIDE BLD-SCNC: 107 MMOL/L (ref 98–107)
CO2 SERPL-SCNC: 30 MMOL/L (ref 22–31)
CREAT SERPL-MCNC: 0.97 MG/DL (ref 0.7–1.3)
ERYTHROCYTE [DISTWIDTH] IN BLOOD BY AUTOMATED COUNT: 13.5 % (ref 11–14.5)
GFR SERPL CREATININE-BSD FRML MDRD: >60 ML/MIN/1.73M2
GLUCOSE BLD-MCNC: 80 MG/DL (ref 70–125)
HCT VFR BLD AUTO: 52.5 % (ref 40–54)
HGB BLD-MCNC: 17 G/DL (ref 14–18)
MCH RBC QN AUTO: 31.5 PG (ref 27–34)
MCHC RBC AUTO-ENTMCNC: 32.4 G/DL (ref 32–36)
MCV RBC AUTO: 97 FL (ref 80–100)
PLATELET # BLD AUTO: 188 THOU/UL (ref 140–440)
PMV BLD AUTO: 11.3 FL (ref 8.5–12.5)
POTASSIUM BLD-SCNC: 4.4 MMOL/L (ref 3.5–5)
RBC # BLD AUTO: 5.4 MILL/UL (ref 4.4–6.2)
SODIUM SERPL-SCNC: 143 MMOL/L (ref 136–145)
VIT B12 SERPL-MCNC: 641 PG/ML (ref 213–816)
WBC: 4 THOU/UL (ref 4–11)

## 2019-05-16 ENCOUNTER — TRANSFERRED RECORDS (OUTPATIENT)
Dept: HEALTH INFORMATION MANAGEMENT | Facility: CLINIC | Age: 84
End: 2019-05-16

## 2019-05-29 ENCOUNTER — RECORDS - HEALTHEAST (OUTPATIENT)
Dept: LAB | Facility: CLINIC | Age: 84
End: 2019-05-29

## 2019-05-31 LAB — 25(OH)D3 SERPL-MCNC: 22.9 NG/ML (ref 30–80)

## 2019-08-09 DIAGNOSIS — E53.8 VITAMIN B 12 DEFICIENCY: Primary | ICD-10-CM

## 2019-08-09 NOTE — TELEPHONE ENCOUNTER
"Requested Prescriptions   Pending Prescriptions Disp Refills     cyanocobalamin (CYANOCOBALAMIN) 1000 MCG/ML injection [Pharmacy Med  Last Written Prescription Date:  6/26/2014  Last Fill Quantity: 1ml,  # refills: 12   Last office visit: 9/25/2018 with prescribing provider:     Future Office Visit:   Name: CYANOCOBALAM INJ 1000MCG] 1 mL 11     Sig: INJECT 1ML INTRAMUSCULARLY ONCE EVERY MONTH DX VITAMIN B-12 DEFICIENCY       Vitamin Supplements (Adult) Protocol Passed - 8/9/2019  9:12 AM        Passed - High dose Vitamin D not ordered        Passed - Recent (12 mo) or future (30 days) visit within the authorizing provider's specialty     Patient had office visit in the last 12 months or has a visit in the next 30 days with authorizing provider or within the authorizing provider's specialty.  See \"Patient Info\" tab in inbasket, or \"Choose Columns\" in Meds & Orders section of the refill encounter.              Passed - Medication is active on med list        "

## 2019-08-12 RX ORDER — CYANOCOBALAMIN 1000 UG/ML
INJECTION, SOLUTION INTRAMUSCULAR; SUBCUTANEOUS
Qty: 1 ML | Refills: 11 | Status: SHIPPED | OUTPATIENT
Start: 2019-08-12 | End: 2020-01-10

## 2019-08-12 NOTE — TELEPHONE ENCOUNTER
Routing refill request to provider for review/approval because:  A break in medication  Last refilled was 6/26/14

## 2019-09-30 ENCOUNTER — RECORDS - HEALTHEAST (OUTPATIENT)
Dept: ADMINISTRATIVE | Facility: OTHER | Age: 84
End: 2019-09-30

## 2019-09-30 LAB — COLOGUARD-ABSTRACT: NEGATIVE

## 2019-10-03 ENCOUNTER — TRANSFERRED RECORDS (OUTPATIENT)
Dept: HEALTH INFORMATION MANAGEMENT | Facility: CLINIC | Age: 84
End: 2019-10-03

## 2019-10-03 LAB — PHQ9 SCORE: 0

## 2019-10-07 ENCOUNTER — RECORDS - HEALTHEAST (OUTPATIENT)
Dept: LAB | Facility: CLINIC | Age: 84
End: 2019-10-07

## 2019-10-07 LAB
ANION GAP SERPL CALCULATED.3IONS-SCNC: 10 MMOL/L (ref 5–18)
BUN SERPL-MCNC: 13 MG/DL (ref 8–28)
CALCIUM SERPL-MCNC: 9.8 MG/DL (ref 8.5–10.5)
CHLORIDE BLD-SCNC: 103 MMOL/L (ref 98–107)
CO2 SERPL-SCNC: 29 MMOL/L (ref 22–31)
CREAT SERPL-MCNC: 1.19 MG/DL (ref 0.7–1.3)
ERYTHROCYTE [DISTWIDTH] IN BLOOD BY AUTOMATED COUNT: 13.3 % (ref 11–14.5)
GFR SERPL CREATININE-BSD FRML MDRD: 57 ML/MIN/1.73M2
GLUCOSE BLD-MCNC: 51 MG/DL (ref 70–125)
HCT VFR BLD AUTO: 53.2 % (ref 40–54)
HGB BLD-MCNC: 16.9 G/DL (ref 14–18)
MCH RBC QN AUTO: 31.9 PG (ref 27–34)
MCHC RBC AUTO-ENTMCNC: 31.8 G/DL (ref 32–36)
MCV RBC AUTO: 100 FL (ref 80–100)
PLATELET # BLD AUTO: 201 THOU/UL (ref 140–440)
PMV BLD AUTO: 11.8 FL (ref 8.5–12.5)
POTASSIUM BLD-SCNC: 5.1 MMOL/L (ref 3.5–5)
RBC # BLD AUTO: 5.3 MILL/UL (ref 4.4–6.2)
SODIUM SERPL-SCNC: 142 MMOL/L (ref 136–145)
WBC: 5.6 THOU/UL (ref 4–11)

## 2019-10-17 ENCOUNTER — RECORDS - HEALTHEAST (OUTPATIENT)
Dept: HEALTH INFORMATION MANAGEMENT | Facility: CLINIC | Age: 84
End: 2019-10-17

## 2019-10-22 ENCOUNTER — RECORDS - HEALTHEAST (OUTPATIENT)
Dept: LAB | Facility: CLINIC | Age: 84
End: 2019-10-22

## 2019-10-22 LAB — POTASSIUM BLD-SCNC: 4.5 MMOL/L (ref 3.5–5)

## 2019-11-25 ENCOUNTER — ASSISTED LIVING VISIT (OUTPATIENT)
Dept: GERIATRICS | Facility: CLINIC | Age: 84
End: 2019-11-25
Payer: MEDICARE

## 2019-11-25 VITALS
BODY MASS INDEX: 25.84 KG/M2 | DIASTOLIC BLOOD PRESSURE: 66 MMHG | SYSTOLIC BLOOD PRESSURE: 139 MMHG | OXYGEN SATURATION: 94 % | WEIGHT: 165 LBS | RESPIRATION RATE: 18 BRPM | HEART RATE: 65 BPM

## 2019-11-25 DIAGNOSIS — F02.80 LATE ONSET ALZHEIMER'S DISEASE WITHOUT BEHAVIORAL DISTURBANCE (H): ICD-10-CM

## 2019-11-25 DIAGNOSIS — K21.9 GASTROESOPHAGEAL REFLUX DISEASE, ESOPHAGITIS PRESENCE NOT SPECIFIED: ICD-10-CM

## 2019-11-25 DIAGNOSIS — G30.1 LATE ONSET ALZHEIMER'S DISEASE WITHOUT BEHAVIORAL DISTURBANCE (H): ICD-10-CM

## 2019-11-25 DIAGNOSIS — R25.1 TREMOR: Primary | ICD-10-CM

## 2019-11-25 DIAGNOSIS — E53.8 VITAMIN B 12 DEFICIENCY: ICD-10-CM

## 2019-11-25 DIAGNOSIS — Z71.89 ADVANCED DIRECTIVES, COUNSELING/DISCUSSION: ICD-10-CM

## 2019-11-25 DIAGNOSIS — M51.379 DEGENERATION OF LUMBAR OR LUMBOSACRAL INTERVERTEBRAL DISC: ICD-10-CM

## 2019-11-25 PROCEDURE — 99207 ZZC CDG-MDM COMPONENT: MEETS MODERATE - UP CODED: CPT | Performed by: NURSE PRACTITIONER

## 2019-11-25 RX ORDER — ACETAMINOPHEN 160 MG
1 TABLET,DISINTEGRATING ORAL EVERY MORNING
COMMUNITY
End: 2021-09-27

## 2019-11-25 RX ORDER — ACETAMINOPHEN 500 MG
1000 TABLET ORAL EVERY 8 HOURS PRN
COMMUNITY
End: 2020-11-16

## 2019-11-25 NOTE — PROGRESS NOTES
[place] [facility]  PRIMARY CARE PROVIDER AND CLINIC:  Milton Quispe, APRN CNP, [address] / [place] [address]  Chief Complaint   Patient presents with     Establish Care     [place] Medical Record Number:  [id number]  Place of Service where encounter took place:  [facility]  [facility] ([facility]) [[id number]]    Aleksandar Decker  is a 89 year old  (8/28/1926), admitted to the above facility from [facility].  Admitted to this facility for  rehab, medical management and nursing care.    HPI:    HPI information obtained from: facility chart records, facility staff, patient report, [facility] chart review, Care Everywhere Epic chart review and family/first contact dtr report.   Brief Summary of Hospital Course:   Tremor. Long h/o. Resting R hand. Seen by Neurology.  Reported to have mild parkinson's features. Gait stable. Does not use assistive device. No h/o falls.    Dementia, Alzheimer's type.  Ongoing memory loss. Initially thought to be LBD per Neuro, however due to stable cognitive  Status, not thought to be LBD. Cont. On aricept, namenda. Per medical records, appears to have taken aricept for past 4 years, namenda 6-7 years.    GERD: cont. On prilosec. Po intake stable. No recent reports of incresed s/s    Low back pain.  Cont. On prn tylenol. Per dtr, has not had recent exac.    B12 def.  Cont. On B12 inj.  hgb stable        Updates on Status Since Skilled nursing Admission:     CODE STATUS/ADVANCE DIRECTIVES DISCUSSION:   DNR / DNI  Patient's living condition: lives in an assisted living facility  ALLERGIES: Penicillins  PAST MEDICAL HISTORY:  has a past medical history of Dementia of the Alzheimer's type (H), Esophageal reflux, Fever and other physiologic disturbances of temperature regulation, GERD (gastroesophageal reflux disease), Hearing loss, Hyperlipidemia LDL goal <160, Hypothyroidism, Lumbago, and Vitamin B 12 deficiency.  PAST SURGICAL  HISTORY:   has a past surgical history that includes NONSPECIFIC PROCEDURE and COLONOSCOPY THRU STOMA, DIAGNOSTIC.  FAMILY HISTORY: family history includes Alcohol/Drug in his father; Family History Negative in his mother.  SOCIAL HISTORY:   reports that he has never smoked. He has never used smokeless tobacco. He reports current alcohol use. He reports that he does not use drugs.    Post Discharge Medication Reconciliation Status: discharge medications reconciled, continue medications without change    Current Outpatient Medications   Medication Sig Dispense Refill     acetaminophen (TYLENOL) 500 MG tablet Take 1,000 mg by mouth every 8 hours as needed for mild pain       Cholecalciferol (VITAMIN D3) 50 MCG (2000 UT) CAPS Take 1 capsule by mouth every morning       cyanocobalamin (CYANOCOBALAMIN) 1000 MCG/ML injection INJECT 1ML INTRAMUSCULARLY ONCE EVERY MONTH DX VITAMIN B-12 DEFICIENCY 1 mL 11     donepezil (ARICEPT) 10 MG tablet Take 1 tablet (10 mg) by mouth At Bedtime 90 tablet 3     memantine (NAMENDA) 10 MG tablet Take 10 mg by mouth daily  180 tablet 3     omeprazole (PRILOSEC) 20 MG CR capsule TAKE ONE CAPSULE BY MOUTH DAILY 90 capsule 2       ROS:  Unobtainable secondary to cognitive impairment. Reports feeling fine today    Vitals:  /66   Pulse 65   Resp 18   Wt 74.8 kg (165 lb)   SpO2 94%   BMI 25.84 kg/m    Exam:  GENERAL APPEARANCE:  Alert, in no distress, cooperative  ENT:  Mouth and posterior oropharynx normal, moist mucous membranes, [place]  EYES:  EOM, conjunctivae, lids, pupils and irises normal, PERRL  NECK:  No adenopathy,masses or thyromegaly, no carotid bruit  RESP:  respiratory effort and palpation of chest normal, lungs clear to auscultation , no respiratory distress  CV:  Palpation and auscultation of heart done , regular rate and rhythm, no murmur, rub, or gallop, peripheral edema trace+ in LEs  ABDOMEN:  normal bowel sounds, soft, nontender, no hepatosplenomegaly or other masses,  no guarding or rebound, no bruits  LYMPHATICS:  No adenopathy in neck , No adenopathy in axillae  M/S:   Gait and station normal  muscle strength 5/5 all 4 ext., normal tone  NEURO:   Cranial nerves 2-12 are normal tested and grossly at patient's baseline, alert, speech clear, occ resting tremor R hand  PSYCH:  insight and judgement impaired, memory impaired , affect and mood normal    Lab/Diagnostic data:  Recent labs in [facility] reviewed by me today.     ASSESSMENT/PLAN:  Tremor  Ongoing R hand, resting  1. Monitor for increased difficulty with ADLs  2. For difficulty using eating utensils, may refer to OT  3. Monitor for increased muscle rigidity, changes in gait  4. F/u with Neurology on prn basis    Late onset Alzheimer's disease without behavioral disturbance (H)  Memory loss. SLUMS 9/30.  1. Cont. Aricept, namenda  2. Monitor for changes in mood, behavior, increased anxiety  3. Cont. Secured memory care unit  4. Monitor for insomnia    Gastroesophageal reflux disease, esophagitis presence not specified  Currently stable  1. Cont. prilosec  2. Monitor for decreased po intake  3. Follow wt.s    CHRONIC LOW BACK PAIN  Currently controlled  1. Cont. Prn tylenol  2. Monitor for increased LE weakness  3. Encourage amb. As mercedes  4. For increased pain, may sched. tylenol    Vitamin B 12 deficiency  hgb stable.  1. Cont. B12 inj  2. Cbc in next 2-4 mos    Advance Care Planning  Spoke with dtr, will have sister, poa fill out POLST when visiting tomorrow. Cont. Full code until that time         Electronically signed by:  LEIA Whyte CNP

## 2019-11-25 NOTE — LETTER
11/25/2019        RE: Aleksandar Decker  [facility]  [address] [address]        [place] [facility]  PRIMARY CARE PROVIDER AND CLINIC:  Milton Quispe, LEIA CNP, [address] [facility] [address] / [facility] [address]  Chief Complaint   Patient presents with     Establish Care     [place] Medical Record Number:  [id number]  Place of Service where encounter took place:  [facility]  [facility] ([facility]) [[id number]]    Aleksandar Decker  is a 89 year old  (8/28/1926), admitted to the above facility from [facility].  Admitted to this facility for  rehab, medical management and nursing care.    HPI:    HPI information obtained from: facility chart records, facility staff, patient report, [facility] chart review, Care Everywhere Epic chart review and family/first contact dtr report.   Brief Summary of Hospital Course:   Tremor. Long h/o. Resting R hand. Seen by Neurology.  Reported to have mild parkinson's features. Gait stable. Does not use assistive device. No h/o falls.    Dementia, Alzheimer's type.  Ongoing memory loss. Initially thought to be LBD per Neuro, however due to stable cognitive  Status, not thought to be LBD. Cont. On aricept, namenda. Per medical records, appears to have taken aricept for past 4 years, namenda 6-7 years.    GERD: cont. On prilosec. Po intake stable. No recent reports of incresed s/s    Low back pain.  Cont. On prn tylenol. Per dtr, has not had recent exac.    B12 def.  Cont. On B12 inj.  hgb stable        Updates on Status Since Skilled nursing Admission:     CODE STATUS/ADVANCE DIRECTIVES DISCUSSION:   DNR / DNI  Patient's living condition: lives in an assisted living facility  ALLERGIES: Penicillins  PAST MEDICAL HISTORY:  has a past medical history of Dementia of the Alzheimer's type (H), Esophageal reflux, Fever and other physiologic disturbances of temperature regulation, GERD (gastroesophageal reflux disease), Hearing  loss, Hyperlipidemia LDL goal <160, Hypothyroidism, Lumbago, and Vitamin B 12 deficiency.  PAST SURGICAL HISTORY:   has a past surgical history that includes NONSPECIFIC PROCEDURE and COLONOSCOPY THRU STOMA, DIAGNOSTIC.  FAMILY HISTORY: family history includes Alcohol/Drug in his father; Family History Negative in his mother.  SOCIAL HISTORY:   reports that he has never smoked. He has never used smokeless tobacco. He reports current alcohol use. He reports that he does not use drugs.    Post Discharge Medication Reconciliation Status: discharge medications reconciled, continue medications without change    Current Outpatient Medications   Medication Sig Dispense Refill     acetaminophen (TYLENOL) 500 MG tablet Take 1,000 mg by mouth every 8 hours as needed for mild pain       Cholecalciferol (VITAMIN D3) 50 MCG (2000 UT) CAPS Take 1 capsule by mouth every morning       cyanocobalamin (CYANOCOBALAMIN) 1000 MCG/ML injection INJECT 1ML INTRAMUSCULARLY ONCE EVERY MONTH DX VITAMIN B-12 DEFICIENCY 1 mL 11     donepezil (ARICEPT) 10 MG tablet Take 1 tablet (10 mg) by mouth At Bedtime 90 tablet 3     memantine (NAMENDA) 10 MG tablet Take 10 mg by mouth daily  180 tablet 3     omeprazole (PRILOSEC) 20 MG CR capsule TAKE ONE CAPSULE BY MOUTH DAILY 90 capsule 2       ROS:  Unobtainable secondary to cognitive impairment. Reports feeling fine today    Vitals:  /66   Pulse 65   Resp 18   Wt 74.8 kg (165 lb)   SpO2 94%   BMI 25.84 kg/m     Exam:  GENERAL APPEARANCE:  Alert, in no distress, cooperative  ENT:  Mouth and posterior oropharynx normal, moist mucous membranes, [place]  EYES:  EOM, conjunctivae, lids, pupils and irises normal, PERRL  NECK:  No adenopathy,masses or thyromegaly, no carotid bruit  RESP:  respiratory effort and palpation of chest normal, lungs clear to auscultation , no respiratory distress  CV:  Palpation and auscultation of heart done , regular rate and rhythm, no murmur, rub, or gallop, peripheral  edema trace+ in LEs  ABDOMEN:  normal bowel sounds, soft, nontender, no hepatosplenomegaly or other masses, no guarding or rebound, no bruits  LYMPHATICS:  No adenopathy in neck , No adenopathy in axillae  M/S:   Gait and station normal  muscle strength 5/5 all 4 ext., normal tone  NEURO:   Cranial nerves 2-12 are normal tested and grossly at patient's baseline, alert, speech clear, occ resting tremor R hand  PSYCH:  insight and judgement impaired, memory impaired , affect and mood normal    Lab/Diagnostic data:  Recent labs in [facility] reviewed by me today.     ASSESSMENT/PLAN:  Tremor  Ongoing R hand, resting  1. Monitor for increased difficulty with ADLs  2. For difficulty using eating utensils, may refer to OT  3. Monitor for increased muscle rigidity, changes in gait  4. F/u with Neurology on prn basis    Late onset Alzheimer's disease without behavioral disturbance (H)  Memory loss. SLUMS 9/30.  1. Cont. Aricept, namenda  2. Monitor for changes in mood, behavior, increased anxiety  3. Cont. Secured memory care unit  4. Monitor for insomnia    Gastroesophageal reflux disease, esophagitis presence not specified  Currently stable  1. Cont. prilosec  2. Monitor for decreased po intake  3. Follow wt.s    CHRONIC LOW BACK PAIN  Currently controlled  1. Cont. Prn tylenol  2. Monitor for increased LE weakness  3. Encourage amb. As mercedes  4. For increased pain, may sched. tylenol    Vitamin B 12 deficiency  hgb stable.  1. Cont. B12 inj  2. Cbc in next 2-4 mos    Advance Care Planning  Spoke with dtr, will have sister, poa fill out POLST when visiting tomorrow. Cont. Full code until that time         Electronically signed by:  LEIA Whyte CNP                       Sincerely,        LEIA Whyte CNP

## 2019-12-12 ENCOUNTER — ASSISTED LIVING VISIT (OUTPATIENT)
Dept: GERIATRICS | Facility: CLINIC | Age: 84
End: 2019-12-12
Payer: MEDICARE

## 2019-12-12 DIAGNOSIS — R22.9 SKIN MASS: ICD-10-CM

## 2019-12-12 DIAGNOSIS — H61.23 BILATERAL IMPACTED CERUMEN: Primary | ICD-10-CM

## 2019-12-12 PROCEDURE — 69209 REMOVE IMPACTED EAR WAX UNI: CPT | Performed by: NURSE PRACTITIONER

## 2019-12-12 NOTE — PROGRESS NOTES
[place] [facility]  [place] Medical Record Number:  [id number]  Place of Service where encounter took place:  [facility]  [facility] ([facility]) [[id number]]  Chief Complaint   Patient presents with     Cerumen (impacted)     Derm Problem       HPI:    Aleksandar Decker  is a 89 year old (8/28/1926), who is being seen today for an episodic care visit.  HPI information obtained from: facility chart records, facility staff, patient report, [facility] chart review and family/first contact dtr report. Today's concern is:cerumen, skin mass face.  Has bilat cerumen. Per dtr, is a recurrent issue. Requesting irrigation. Completed course of debrox.  Has h/o melanoma, face.  Had derm appt last week. bx site R cheek. Scab present, no drainage.       Past Medical and Surgical History reviewed in Epic today.    MEDICATIONS:  Current Outpatient Medications   Medication Sig Dispense Refill     acetaminophen (TYLENOL) 500 MG tablet Take 1,000 mg by mouth every 8 hours as needed for mild pain       Cholecalciferol (VITAMIN D3) 50 MCG (2000 UT) CAPS Take 1 capsule by mouth every morning       cyanocobalamin (CYANOCOBALAMIN) 1000 MCG/ML injection INJECT 1ML INTRAMUSCULARLY ONCE EVERY MONTH DX VITAMIN B-12 DEFICIENCY 1 mL 11     donepezil (ARICEPT) 10 MG tablet Take 1 tablet (10 mg) by mouth At Bedtime 90 tablet 3     memantine (NAMENDA) 10 MG tablet Take 10 mg by mouth daily  180 tablet 3     omeprazole (PRILOSEC) 20 MG CR capsule TAKE ONE CAPSULE BY MOUTH DAILY 90 capsule 2         REVIEW OF SYSTEMS:  Unobtainable secondary to cognitive impairment. Reports feeling fine today    Objective:  /67   Pulse 76   Resp 18   Exam:  GENERAL APPEARANCE:  Alert, in no distress, cooperative  ENT:  Mouth and posterior oropharynx normal, moist mucous membranes, [place],  bilat ear canals free of cerumen s/p irrigation  EYES:  EOM, conjunctivae, lids, pupils and irises normal, PERRL  NECK:  No adenopathy,masses or thyromegaly,  FROM  RESP:  respiratory effort and palpation of chest normal, lungs clear to auscultation , no respiratory distress  CV:  Palpation and auscultation of heart done , regular rate and rhythm, no murmur, rub, or gallop, no edema  ABDOMEN:  normal bowel sounds, soft, nontender, no hepatosplenomegaly or other masses, no guarding or rebound  SKIN:  mult area of sark skin on face, forehead. small scabbed area R cheek  NEURO:   Cranial nerves 2-12 are normal tested and grossly at patient's baseline, alert, speech clear  PSYCH:  insight and judgement impaired, memory impaired , affect and mood normal    Labs:     Most Recent 3 CBC's:  Recent Labs   Lab Test 09/25/18  0944 08/29/17  1521 09/06/16  1551   WBC 4.6 5.4 5.8   HGB 17.4 16.0 17.3   MCV 97 96 96    173 165     Most Recent 3 BMP's:  Recent Labs   Lab Test 09/25/18  0944 08/29/17  1521 09/06/16  1551    144 144   POTASSIUM 4.6 4.4 5.0   CHLORIDE 104 108 108   CO2 28 32 30   BUN 11 18 13   CR 1.13 1.14 1.08   ANIONGAP 9 4 6   MARIBEL 9.2 8.6 9.0   GLC 94 93 89       ASSESSMENT/PLAN:  Bilateral impacted cerumen  Ear canals free of cerumen s/p debrox gtts, irrigation  - REMOVE IMPACTED CERUMEN  2. Monitor for further cerumen of ear canals  3. For above, repeat debrox gtts, irrigation      Skin mass  Mult. Sites on face. bx site R cheek stable  1. Await bx results  2. F/u with derm as indicated      Electronically signed by:  LEIA Whyte CNP

## 2019-12-12 NOTE — LETTER
12/12/2019        RE: Aleksandar Decker  [facility]  [address] [address]        [place] [facility]  [place] Medical Record Number:  [id number]  Place of Service where encounter took place:  [facility] ([facility]) [[id number]]  Chief Complaint   Patient presents with     Cerumen (impacted)     Derm Problem       HPI:    Aleksandar Decker  is a 89 year old (8/28/1926), who is being seen today for an episodic care visit.  HPI information obtained from: facility chart records, facility staff, patient report, [facility] chart review and family/first contact dtr report. Today's concern is:cerumen, skin mass face.  Has bilat cerumen. Per dtr, is a recurrent issue. Requesting irrigation. Completed course of debrox.  Has h/o melanoma, face.  Had derm appt last week. bx site R cheek. Scab present, no drainage.       Past Medical and Surgical History reviewed in Epic today.    MEDICATIONS:  Current Outpatient Medications   Medication Sig Dispense Refill     acetaminophen (TYLENOL) 500 MG tablet Take 1,000 mg by mouth every 8 hours as needed for mild pain       Cholecalciferol (VITAMIN D3) 50 MCG (2000 UT) CAPS Take 1 capsule by mouth every morning       cyanocobalamin (CYANOCOBALAMIN) 1000 MCG/ML injection INJECT 1ML INTRAMUSCULARLY ONCE EVERY MONTH DX VITAMIN B-12 DEFICIENCY 1 mL 11     donepezil (ARICEPT) 10 MG tablet Take 1 tablet (10 mg) by mouth At Bedtime 90 tablet 3     memantine (NAMENDA) 10 MG tablet Take 10 mg by mouth daily  180 tablet 3     omeprazole (PRILOSEC) 20 MG CR capsule TAKE ONE CAPSULE BY MOUTH DAILY 90 capsule 2         REVIEW OF SYSTEMS:  Unobtainable secondary to cognitive impairment. Reports feeling fine today    Objective:  /67   Pulse 76   Resp 18   Exam:  GENERAL APPEARANCE:  Alert, in no distress, cooperative  ENT:  Mouth and posterior oropharynx normal, moist mucous membranes, [place],  bilat ear canals free of cerumen s/p  irrigation  EYES:  EOM, conjunctivae, lids, pupils and irises normal, PERRL  NECK:  No adenopathy,masses or thyromegaly, FROM  RESP:  respiratory effort and palpation of chest normal, lungs clear to auscultation , no respiratory distress  CV:  Palpation and auscultation of heart done , regular rate and rhythm, no murmur, rub, or gallop, no edema  ABDOMEN:  normal bowel sounds, soft, nontender, no hepatosplenomegaly or other masses, no guarding or rebound  SKIN:  mult area of sark skin on face, forehead. small scabbed area R cheek  NEURO:   Cranial nerves 2-12 are normal tested and grossly at patient's baseline, alert, speech clear  PSYCH:  insight and judgement impaired, memory impaired , affect and mood normal    Labs:     Most Recent 3 CBC's:  Recent Labs   Lab Test 09/25/18  0944 08/29/17  1521 09/06/16  1551   WBC 4.6 5.4 5.8   HGB 17.4 16.0 17.3   MCV 97 96 96    173 165     Most Recent 3 BMP's:  Recent Labs   Lab Test 09/25/18  0944 08/29/17  1521 09/06/16  1551    144 144   POTASSIUM 4.6 4.4 5.0   CHLORIDE 104 108 108   CO2 28 32 30   BUN 11 18 13   CR 1.13 1.14 1.08   ANIONGAP 9 4 6   MARIBEL 9.2 8.6 9.0   GLC 94 93 89       ASSESSMENT/PLAN:  Bilateral impacted cerumen  Ear canals free of cerumen s/p debrox gtts, irrigation  - REMOVE IMPACTED CERUMEN  2. Monitor for further cerumen of ear canals  3. For above, repeat debrox gtts, irrigation      Skin mass  Mult. Sites on face. bx site R cheek stable  1. Await bx results  2. F/u with derm as indicated      Electronically signed by:  LEIA Whyte CNP             Sincerely,        LEIA Whyte CNP

## 2019-12-13 VITALS — SYSTOLIC BLOOD PRESSURE: 122 MMHG | RESPIRATION RATE: 18 BRPM | DIASTOLIC BLOOD PRESSURE: 67 MMHG | HEART RATE: 76 BPM

## 2020-01-09 DIAGNOSIS — E53.8 VITAMIN B 12 DEFICIENCY: ICD-10-CM

## 2020-01-10 RX ORDER — CYANOCOBALAMIN 1000 UG/ML
INJECTION, SOLUTION INTRAMUSCULAR; SUBCUTANEOUS
Qty: 1 ML | Refills: 97 | Status: SHIPPED | OUTPATIENT
Start: 2020-01-10 | End: 2021-02-08

## 2020-01-10 RX ORDER — SYRINGE W-NEEDLE,DISPOSAB,3 ML 25GX5/8"
SYRINGE, EMPTY DISPOSABLE MISCELLANEOUS
Qty: 50 EACH | Refills: 11 | Status: SHIPPED | OUTPATIENT
Start: 2020-01-10 | End: 2021-02-08

## 2020-01-13 ENCOUNTER — ASSISTED LIVING VISIT (OUTPATIENT)
Dept: GERIATRICS | Facility: CLINIC | Age: 85
End: 2020-01-13
Payer: MEDICARE

## 2020-01-13 VITALS
RESPIRATION RATE: 20 BRPM | TEMPERATURE: 96.3 F | SYSTOLIC BLOOD PRESSURE: 151 MMHG | BODY MASS INDEX: 25.84 KG/M2 | DIASTOLIC BLOOD PRESSURE: 76 MMHG | WEIGHT: 165 LBS | HEART RATE: 75 BPM

## 2020-01-13 DIAGNOSIS — G30.1 LATE ONSET ALZHEIMER'S DISEASE WITHOUT BEHAVIORAL DISTURBANCE (H): ICD-10-CM

## 2020-01-13 DIAGNOSIS — F02.80 LATE ONSET ALZHEIMER'S DISEASE WITHOUT BEHAVIORAL DISTURBANCE (H): ICD-10-CM

## 2020-01-13 DIAGNOSIS — E53.8 VITAMIN B 12 DEFICIENCY: ICD-10-CM

## 2020-01-13 DIAGNOSIS — K21.9 GASTROESOPHAGEAL REFLUX DISEASE, ESOPHAGITIS PRESENCE NOT SPECIFIED: Primary | ICD-10-CM

## 2020-01-13 DIAGNOSIS — M51.379 DEGENERATION OF LUMBAR OR LUMBOSACRAL INTERVERTEBRAL DISC: ICD-10-CM

## 2020-01-13 RX ORDER — DONEPEZIL HYDROCHLORIDE 10 MG/1
10 TABLET, FILM COATED ORAL EVERY MORNING
COMMUNITY
End: 2020-12-01

## 2020-01-13 NOTE — PROGRESS NOTES
Aleksandar Decker is a 89 year old male seen January 13, 2020 at [facility] unit where he has resided for 2 months (admit 11/2019) seen for initial visit.   Pt is seen in his apartment, ambulatory without device    He is pleasant and joking, states he feels well.  Can be repetitive, but social graces intact   He ambulates without assistive device and participates in activities.       By chart review, patient has had memory loss since 2009.   I saw him in my [facility] [id number]   Started on donepezil and memantine ten years ago.   Appears to tolerate them okay, and has had a remarkably gradual progression of his dementia    Past Medical History:   Diagnosis Date     Dementia of the Alzheimer's type (H)     dr Jacobo and neurologist: Kul[facility]     Esophageal reflux      Fever and other physiologic disturbances of temperature regulation     2005 ? tick related illness w fever;; resolved       GERD (gastroesophageal reflux disease)     better with ranitidine     Hearing loss     uses aides      Hyperlipidemia LDL goal <160      Hypothyroidism      Lumbago     chronic , non radicular     Vitamin B 12 deficiency      Past Surgical History:   Procedure Laterality Date     C NONSPECIFIC PROCEDURE      appy      HC COLONOSCOPY THRU STOMA, DIAGNOSTIC      had colonoscopy in [place] 2007 (normal)      SH:  .   Previously [facility].  He has 3 daughters: Carla, Kyra and Arelis.    He can't remember where they live.   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   Non smoker    ROS:  Ambulatory without device  MoCA 18/30  SLUMS 5/30 1/2019  Wt Readings from Last 5 Encounters:   01/13/20 74.8 kg (165 lb)   11/25/19 74.8 kg (165 lb)   09/25/18 74.8 kg (165 lb)   08/01/18 75.1 kg (165 lb 9.6 oz)   08/29/17 77.6 kg (171 lb 1.6 oz)      EXAM:   NAD  BP (!) 151/76   Pulse 75   Temp 96.3  F (35.7  C)   Resp 20   Wt 74.8 kg (165 lb)   BMI 25.84 kg/m     Well  healed scar on left cheek  Neck supple without adenopathy  Lungs with decreased BS, no rales or wheeze  Heart RRR s1s2, 3/6 JOSH @LSB andacross precordium  Abd soft, NT, no distention, +BS  Ext without edema  Neuro: preserved language and mobility, repetitive, some disorientation.   +right hand resting tremor, no stiffness or rigidity  Psych: affect okay, cheery.     Labs reviewed    IMP/PLAN:   (K21.9) Gastroesophageal reflux disease, esophagitis presence not specified   Comment: no current sx  Plan: continue omeprazole 20 mg/day and follow     (G30.1,  F02.80) Late onset Alzheimer's disease without behavioral disturbance (H)  Comment: very gradual decline over 10 years.   Low functional status and STML with preserved language and mobility   Plan: [facility] Memory Care unit for support with meds, meals, activity, secure unit    Continue PTA donepezil and memantine        (M51.37) CHRONIC LOW BACK PAIN  Comment: intermittent   Plan: prn acetaminophen, local measures    (E53.8) Vitamin B 12 deficiency  Plan: continue monthly B12 1000 units SQ     Violeta Jacobo MD

## 2020-01-13 NOTE — LETTER
1/13/2020        RE: Aleksandar Decker  [facility]  [address] [facility] [address]        Aleksandar Decker is a 89 year old male seen January 13, 2020 at [facility] unit where he has resided for 2 months (admit 11/2019) seen for initial visit.   Pt is seen in his apartment, ambulatory without device    He is pleasant and joking, states he feels well.  Can be repetitive, but social graces intact   He ambulates without assistive device and participates in activities.       By chart review, patient has had memory loss since 2009.   I saw him in my [facility] [id number]   Started on donepezil and memantine ten years ago.   Appears to tolerate them okay, and has had a remarkably gradual progression of his dementia    Past Medical History:   Diagnosis Date     Dementia of the Alzheimer's type (H)     dr Jacobo and neurologist: Kulwant     Esophageal reflux      Fever and other physiologic disturbances of temperature regulation     2005 ? tick related illness w fever;; resolved       GERD (gastroesophageal reflux disease)     better with ranitidine     Hearing loss     uses aides      Hyperlipidemia LDL goal <160      Hypothyroidism      Lumbago     chronic , non radicular     Vitamin B 12 deficiency      Past Surgical History:   Procedure Laterality Date     C NONSPECIFIC PROCEDURE      appy      HC COLONOSCOPY THRU STOMA, DIAGNOSTIC      had colonoscopy in [place] 2007 (normal)      SH:  .   Previously lived at [facility].  He has 3 daughters: Carla, Kyra and Arelis.    He can't remember where they live.   Patient is a retired ; also played clarinet in a band.   Non smoker    ROS:  Ambulatory without device  MoCA 18/30  SLUMS 5/30 1/2019  Wt Readings from Last 5 Encounters:   01/13/20 74.8 kg (165 lb)   11/25/19 74.8 kg (165 lb)   09/25/18 74.8 kg (165 lb)   08/01/18 75.1 kg (165 lb 9.6 oz)   08/29/17 77.6 kg (171 lb 1.6 oz)      EXAM:   NAD  BP (!)  151/76   Pulse 75   Temp 96.3  F (35.7  C)   Resp 20   Wt 74.8 kg (165 lb)   BMI 25.84 kg/m      Well healed scar on left cheek  Neck supple without adenopathy  Lungs with decreased BS, no rales or wheeze  Heart RRR s1s2, 3/6 JOSH @LSB andacross precordium  Abd soft, NT, no distention, +BS  Ext without edema  Neuro: preserved language and mobility, repetitive, some disorientation.   +right hand resting tremor, no stiffness or rigidity  Psych: affect okay, cheery.     Labs reviewed    IMP/PLAN:   (K21.9) Gastroesophageal reflux disease, esophagitis presence not specified   Comment: no current sx  Plan: continue omeprazole 20 mg/day and follow     (G30.1,  F02.80) Late onset Alzheimer's disease without behavioral disturbance (H)  Comment: very gradual decline over 10 years.   Low functional status and STML with preserved language and mobility   Plan: [facility] Memory Care unit for support with meds, meals, activity, secure unit    Continue PTA donepezil and memantine        (M51.37) CHRONIC LOW BACK PAIN  Comment: intermittent   Plan: prn acetaminophen, local measures    (E53.8) Vitamin B 12 deficiency  Plan: continue monthly B12 1000 units SQ     Violeta Jacobo MD       Sincerely,        Violeta Jacobo MD

## 2020-02-06 ENCOUNTER — TELEPHONE (OUTPATIENT)
Dept: GERIATRICS | Facility: CLINIC | Age: 85
End: 2020-02-06

## 2020-02-06 NOTE — TELEPHONE ENCOUNTER
Memory care patient and staff found empty Melatonin bottle in his bathroom around 1:30 today.  Unknown if he ate any.  Called at 5:30 no change in mentation or physical activity.  /82 pulse 70.    Continue to monitor call if abrupt changes.    Laura Villanueva NP

## 2020-03-23 ENCOUNTER — DOCUMENTATION ONLY (OUTPATIENT)
Dept: OTHER | Facility: CLINIC | Age: 85
End: 2020-03-23

## 2020-09-04 ENCOUNTER — ASSISTED LIVING VISIT (OUTPATIENT)
Dept: GERIATRICS | Facility: CLINIC | Age: 85
End: 2020-09-04
Payer: MEDICARE

## 2020-09-04 VITALS
SYSTOLIC BLOOD PRESSURE: 139 MMHG | OXYGEN SATURATION: 93 % | DIASTOLIC BLOOD PRESSURE: 75 MMHG | RESPIRATION RATE: 18 BRPM | HEART RATE: 61 BPM

## 2020-09-04 DIAGNOSIS — G30.1 LATE ONSET ALZHEIMER'S DISEASE WITHOUT BEHAVIORAL DISTURBANCE (H): ICD-10-CM

## 2020-09-04 DIAGNOSIS — K21.9 GASTROESOPHAGEAL REFLUX DISEASE, ESOPHAGITIS PRESENCE NOT SPECIFIED: Primary | ICD-10-CM

## 2020-09-04 DIAGNOSIS — M51.379 DEGENERATION OF LUMBAR OR LUMBOSACRAL INTERVERTEBRAL DISC: ICD-10-CM

## 2020-09-04 DIAGNOSIS — F02.80 LATE ONSET ALZHEIMER'S DISEASE WITHOUT BEHAVIORAL DISTURBANCE (H): ICD-10-CM

## 2020-09-04 NOTE — LETTER
"    9/4/2020        RE: Aleksandar Decker  [facility]  [address] [facility] [address]        [place] [facility]  [place] Medical Record Number:  [id number]  Place of Service where encounter took place:  [facility] [email or web address]  [facility] ([facility]) [[id number]]  Chief Complaint   Patient presents with     Gastrophageal Reflux       HPI:    Aleksandar Decker  is a 89 year old (8/28/1926), who is being seen today for an episodic care visit.  HPI information obtained from: facility chart records, facility staff, patient report and [facility] chart review. Today's concern is: GERD, low back pain, alzheimer's.  For GERD cont. On prilosec.  Po intake stable.  No recent reports of nausea. Has h/o low back pain.  Has been stable. No recent prn tylenol use.  Amb. Without assistive device.  No recent falls. No reports of LE weakness.  Has ongoing tremor of R hand-chronic, unchanged.  No reports of diif. With ADLs, eating due to tremor.  For alzheimer's cont. On aricept, namenda.  Mood, behaviors gen. Stable.       Past Medical and Surgical History reviewed in Epic today.    MEDICATIONS:    Current Outpatient Medications   Medication Sig Dispense Refill     acetaminophen (TYLENOL) 500 MG tablet Take 1,000 mg by mouth every 8 hours as needed for mild pain       Cholecalciferol (VITAMIN D3) 50 MCG (2000 UT) CAPS Take 1 capsule by mouth every morning       cyanocobalamin (CYANOCOBALAMIN) 1000 MCG/ML injection INJECT 1ML (1000MCG) AS DIRECTED ONCE A MONTH 1 mL 97     donepezil (ARICEPT) 10 MG tablet Take 10 mg by mouth every morning       memantine (NAMENDA) 10 MG tablet Take 10 mg by mouth daily  180 tablet 3     omeprazole (PRILOSEC) 20 MG CR capsule TAKE ONE CAPSULE BY MOUTH DAILY 90 capsule 2     [employer] TechnologiesISHPOINT SAFETY SYRINGE 25G X 1\" 3 ML MISC USE AS DIRECTED TO INJECT CYANOCOBALAMIN ONCE A MONTH 50 each 11         REVIEW OF SYSTEMS:  Unobtainable secondary to aphasia. Reports feeling fine " today    Objective:  /75   Pulse 61   Resp 18   SpO2 93%   Exam:  GENERAL APPEARANCE:  Alert, in no distress, cooperative  ENT:  Mouth and posterior oropharynx normal, moist mucous membranes, [place]  EYES:  EOM, conjunctivae, lids, pupils and irises normal, PERRL  NECK:  No adenopathy,masses or thyromegaly, FROM  RESP:  respiratory effort and palpation of chest normal, lungs clear to auscultation , no respiratory distress  CV:  Palpation and auscultation of heart done , regular rate and rhythm, no murmur, rub, or gallop, no edema  ABDOMEN:  normal bowel sounds, soft, nontender, no hepatosplenomegaly or other masses, no guarding or rebound  M/S:   Gait and station normal  muscle strength 5/5 all 4 ext.  no low back pain with ROM LEs  NEURO:   Cranial nerves 2-12 are normal tested and grossly at patient's baseline, R hand tremor  PSYCH:  insight and judgement impaired, memory impaired , affect and mood normal    Labs:     Most Recent 3 CBC's:  Recent Labs   Lab Test 09/25/18  0944 08/29/17  1521 09/06/16  1551   WBC 4.6 5.4 5.8   HGB 17.4 16.0 17.3   MCV 97 96 96    173 165     Most Recent 3 BMP's:  Recent Labs   Lab Test 09/25/18  0944 08/29/17  1521 09/06/16  1551    144 144   POTASSIUM 4.6 4.4 5.0   CHLORIDE 104 108 108   CO2 28 32 30   BUN 11 18 13   CR 1.13 1.14 1.08   ANIONGAP 9 4 6   MARIBEL 9.2 8.6 9.0   GLC 94 93 89       ASSESSMENT/PLAN:  Gastroesophageal reflux disease, esophagitis presence not specified  Currently stable  1. Cont. prilosec  2. Remind to sit up following meals  3. Monitor for changes in po intake  4. Follow wt.s  5. Cbc, bmp next week    CHRONIC LOW BACK PAIN  Currently stable  1. Cont. Prn tylenol  2. Monitor for reports of low back pain  3. Monitor for difficulty with transfers, changes in gait  4. For increased pain, may sched. tylenol    Late onset Alzheimer's disease without behavioral disturbance (H)  Memory loss. Mood, behaviors gen. Stable  1. Cont. Aricept,  namenda  2. Cont. Secured memory care unit  3. Cont. B12  4. Monitor for changes in mood, behavior      Electronically signed by:  LEIA Whyte CNP               Sincerely,        LEIA Whyte CNP

## 2020-09-04 NOTE — PROGRESS NOTES
"[place] [facility]  [place] Medical Record Number:  [id number]  Place of Service where encounter took place:  [facility]  [facility] ([facility]) [[id number]]  Chief Complaint   Patient presents with     Gastrophageal Reflux       HPI:    Aleksandar Decker  is a 89 year old (8/28/1926), who is being seen today for an episodic care visit.  HPI information obtained from: facility chart records, facility staff, patient report and [facility] chart review. Today's concern is: GERD, low back pain, alzheimer's.  For GERD cont. On prilosec.  Po intake stable.  No recent reports of nausea. Has h/o low back pain.  Has been stable. No recent prn tylenol use.  Amb. Without assistive device.  No recent falls. No reports of LE weakness.  Has ongoing tremor of R hand-chronic, unchanged.  No reports of diif. With ADLs, eating due to tremor.  For alzheimer's cont. On aricept, namenda.  Mood, behaviors gen. Stable.       Past Medical and Surgical History reviewed in Epic today.    MEDICATIONS:    Current Outpatient Medications   Medication Sig Dispense Refill     acetaminophen (TYLENOL) 500 MG tablet Take 1,000 mg by mouth every 8 hours as needed for mild pain       Cholecalciferol (VITAMIN D3) 50 MCG (2000 UT) CAPS Take 1 capsule by mouth every morning       cyanocobalamin (CYANOCOBALAMIN) 1000 MCG/ML injection INJECT 1ML (1000MCG) AS DIRECTED ONCE A MONTH 1 mL 97     donepezil (ARICEPT) 10 MG tablet Take 10 mg by mouth every morning       memantine (NAMENDA) 10 MG tablet Take 10 mg by mouth daily  180 tablet 3     omeprazole (PRILOSEC) 20 MG CR capsule TAKE ONE CAPSULE BY MOUTH DAILY 90 capsule 2     [employer] SAFETY SYRINGE 25G X 1\" 3 ML MISC USE AS DIRECTED TO INJECT CYANOCOBALAMIN ONCE A MONTH 50 each 11         REVIEW OF SYSTEMS:  Unobtainable secondary to aphasia. Reports feeling fine today    Objective:  /75   Pulse 61   Resp 18   SpO2 93%   Exam:  GENERAL APPEARANCE:  Alert, in no distress, " cooperative  ENT:  Mouth and posterior oropharynx normal, moist mucous membranes, [place]  EYES:  EOM, conjunctivae, lids, pupils and irises normal, PERRL  NECK:  No adenopathy,masses or thyromegaly, FROM  RESP:  respiratory effort and palpation of chest normal, lungs clear to auscultation , no respiratory distress  CV:  Palpation and auscultation of heart done , regular rate and rhythm, no murmur, rub, or gallop, no edema  ABDOMEN:  normal bowel sounds, soft, nontender, no hepatosplenomegaly or other masses, no guarding or rebound  M/S:   Gait and station normal  muscle strength 5/5 all 4 ext.  no low back pain with ROM LEs  NEURO:   Cranial nerves 2-12 are normal tested and grossly at patient's baseline, R hand tremor  PSYCH:  insight and judgement impaired, memory impaired , affect and mood normal    Labs:     Most Recent 3 CBC's:  Recent Labs   Lab Test 09/25/18  0944 08/29/17  1521 09/06/16  1551   WBC 4.6 5.4 5.8   HGB 17.4 16.0 17.3   MCV 97 96 96    173 165     Most Recent 3 BMP's:  Recent Labs   Lab Test 09/25/18  0944 08/29/17  1521 09/06/16  1551    144 144   POTASSIUM 4.6 4.4 5.0   CHLORIDE 104 108 108   CO2 28 32 30   BUN 11 18 13   CR 1.13 1.14 1.08   ANIONGAP 9 4 6   MARIBEL 9.2 8.6 9.0   GLC 94 93 89       ASSESSMENT/PLAN:  Gastroesophageal reflux disease, esophagitis presence not specified  Currently stable  1. Cont. prilosec  2. Remind to sit up following meals  3. Monitor for changes in po intake  4. Follow wt.s  5. Cbc, bmp next week    CHRONIC LOW BACK PAIN  Currently stable  1. Cont. Prn tylenol  2. Monitor for reports of low back pain  3. Monitor for difficulty with transfers, changes in gait  4. For increased pain, may sched. tylenol    Late onset Alzheimer's disease without behavioral disturbance (H)  Memory loss. Mood, behaviors gen. Stable  1. Cont. Aricept, namenda  2. Cont. Secured memory care unit  3. Cont. B12  4. Monitor for changes in mood, behavior      Electronically signed  by:  LEIA Whyte CNP

## 2020-09-06 ENCOUNTER — RECORDS - HEALTHEAST (OUTPATIENT)
Dept: LAB | Facility: CLINIC | Age: 85
End: 2020-09-06

## 2020-09-08 LAB
ANION GAP SERPL CALCULATED.3IONS-SCNC: 7 MMOL/L (ref 5–18)
BUN SERPL-MCNC: 10 MG/DL (ref 8–28)
CALCIUM SERPL-MCNC: 8.8 MG/DL (ref 8.5–10.5)
CHLORIDE BLD-SCNC: 104 MMOL/L (ref 98–107)
CO2 SERPL-SCNC: 30 MMOL/L (ref 22–31)
CREAT SERPL-MCNC: 1.01 MG/DL (ref 0.7–1.3)
ERYTHROCYTE [DISTWIDTH] IN BLOOD BY AUTOMATED COUNT: 13.4 % (ref 11–14.5)
GFR SERPL CREATININE-BSD FRML MDRD: >60 ML/MIN/1.73M2
GLUCOSE BLD-MCNC: 83 MG/DL (ref 70–125)
HCT VFR BLD AUTO: 51.1 % (ref 40–54)
HGB BLD-MCNC: 16.8 G/DL (ref 14–18)
MCH RBC QN AUTO: 32.2 PG (ref 27–34)
MCHC RBC AUTO-ENTMCNC: 32.9 G/DL (ref 32–36)
MCV RBC AUTO: 98 FL (ref 80–100)
PLATELET # BLD AUTO: 183 THOU/UL (ref 140–440)
PMV BLD AUTO: 11.4 FL (ref 8.5–12.5)
POTASSIUM BLD-SCNC: 4.7 MMOL/L (ref 3.5–5)
RBC # BLD AUTO: 5.22 MILL/UL (ref 4.4–6.2)
SODIUM SERPL-SCNC: 141 MMOL/L (ref 136–145)
WBC: 3.7 THOU/UL (ref 4–11)

## 2020-10-13 ENCOUNTER — RECORDS - HEALTHEAST (OUTPATIENT)
Dept: LAB | Facility: CLINIC | Age: 85
End: 2020-10-13

## 2020-10-14 ENCOUNTER — ASSISTED LIVING VISIT (OUTPATIENT)
Dept: GERIATRICS | Facility: CLINIC | Age: 85
End: 2020-10-14
Payer: MEDICARE

## 2020-10-14 VITALS
SYSTOLIC BLOOD PRESSURE: 131 MMHG | OXYGEN SATURATION: 93 % | DIASTOLIC BLOOD PRESSURE: 76 MMHG | RESPIRATION RATE: 18 BRPM | HEART RATE: 68 BPM

## 2020-10-14 DIAGNOSIS — F02.80 LATE ONSET ALZHEIMER'S DISEASE WITHOUT BEHAVIORAL DISTURBANCE (H): ICD-10-CM

## 2020-10-14 DIAGNOSIS — H61.23 BILATERAL IMPACTED CERUMEN: Primary | ICD-10-CM

## 2020-10-14 DIAGNOSIS — G30.1 LATE ONSET ALZHEIMER'S DISEASE WITHOUT BEHAVIORAL DISTURBANCE (H): ICD-10-CM

## 2020-10-14 PROBLEM — M54.50 LOW BACK PAIN: Status: ACTIVE | Noted: 2020-10-14

## 2020-10-14 PROBLEM — E78.00 HYPERCHOLESTEREMIA: Status: ACTIVE | Noted: 2020-10-14

## 2020-10-14 PROBLEM — R25.1 TREMOR: Status: ACTIVE | Noted: 2020-10-14

## 2020-10-14 LAB
ANION GAP SERPL CALCULATED.3IONS-SCNC: 8 MMOL/L (ref 5–18)
BASOPHILS # BLD AUTO: 0 THOU/UL (ref 0–0.2)
BASOPHILS NFR BLD AUTO: 1 % (ref 0–2)
BUN SERPL-MCNC: 13 MG/DL (ref 8–28)
CALCIUM SERPL-MCNC: 8.5 MG/DL (ref 8.5–10.5)
CHLORIDE BLD-SCNC: 105 MMOL/L (ref 98–107)
CO2 SERPL-SCNC: 30 MMOL/L (ref 22–31)
CREAT SERPL-MCNC: 1.09 MG/DL (ref 0.7–1.3)
EOSINOPHIL # BLD AUTO: 0.1 THOU/UL (ref 0–0.4)
EOSINOPHIL NFR BLD AUTO: 2 % (ref 0–6)
ERYTHROCYTE [DISTWIDTH] IN BLOOD BY AUTOMATED COUNT: 13 % (ref 11–14.5)
GFR SERPL CREATININE-BSD FRML MDRD: >60 ML/MIN/1.73M2
GLUCOSE BLD-MCNC: 92 MG/DL (ref 70–125)
HCT VFR BLD AUTO: 48.7 % (ref 40–54)
HGB BLD-MCNC: 16.5 G/DL (ref 14–18)
IMM GRANULOCYTES # BLD: 0 THOU/UL
IMM GRANULOCYTES NFR BLD: 0 %
LYMPHOCYTES # BLD AUTO: 1.7 THOU/UL (ref 0.8–4.4)
LYMPHOCYTES NFR BLD AUTO: 34 % (ref 20–40)
MCH RBC QN AUTO: 32.5 PG (ref 27–34)
MCHC RBC AUTO-ENTMCNC: 33.9 G/DL (ref 32–36)
MCV RBC AUTO: 96 FL (ref 80–100)
MONOCYTES # BLD AUTO: 0.5 THOU/UL (ref 0–0.9)
MONOCYTES NFR BLD AUTO: 10 % (ref 2–10)
NEUTROPHILS # BLD AUTO: 2.6 THOU/UL (ref 2–7.7)
NEUTROPHILS NFR BLD AUTO: 54 % (ref 50–70)
PLATELET # BLD AUTO: 181 THOU/UL (ref 140–440)
PMV BLD AUTO: 11.5 FL (ref 8.5–12.5)
POTASSIUM BLD-SCNC: 4.4 MMOL/L (ref 3.5–5)
RBC # BLD AUTO: 5.08 MILL/UL (ref 4.4–6.2)
SODIUM SERPL-SCNC: 143 MMOL/L (ref 136–145)
TSH SERPL DL<=0.005 MIU/L-ACNC: 2.36 UIU/ML (ref 0.3–5)
WBC: 4.9 THOU/UL (ref 4–11)

## 2020-10-14 NOTE — LETTER
"    10/14/2020        RE: Aleksandar Decker  [facility]  [address] [facility] [address]        [place] [facility]  [place] Medical Record Number: [id number]  Place of Service where encounter took place: [facility]  [facility] ([facility]) [[id number]]  Chief Complaint   Patient presents with     Cerumen (impacted)     Dementia       HPI:    Aleksandar Decker is a 89 year old (8/28/1926), who is being seen today for an episodic care visit. HPI information obtained from: facility chart records, facility staff, patient report and [facility] chart review. Today's concern is: cerumen, alzheimer's. Has hearing loss. Lost one hearing aid.  Per staff, has difficulty hearing them, some difficulty following prompts. Has bilat cerumen upon exam.  Has memory loss due to alzheimer's.  Cont. On aricpet, namenda.  Sleeping ok at hs.  Per staff, has been sleeping in in am, sometimes not getting out of bed/room until the afternoon. Once aware, mood, behaviors stable, unchanged.  Did get up and agreed to get dressed, come out for breakfast this am with Nurse prompt.       Past Medical and Surgical History reviewed in Epic today.    MEDICATIONS:    Current Outpatient Medications   Medication Sig Dispense Refill     acetaminophen (TYLENOL) 500 MG tablet Take 1,000 mg by mouth every 8 hours as needed for mild pain       Cholecalciferol (VITAMIN D3) 50 MCG (2000 UT) CAPS Take 1 capsule by mouth every morning       cyanocobalamin (CYANOCOBALAMIN) 1000 MCG/ML injection INJECT 1ML (1000MCG) AS DIRECTED ONCE A MONTH 1 mL 97     donepezil (ARICEPT) 10 MG tablet Take 10 mg by mouth every morning       memantine (NAMENDA) 10 MG tablet Take 10 mg by mouth daily  180 tablet 3     omeprazole (PRILOSEC) 20 MG CR capsule TAKE ONE CAPSULE BY MOUTH DAILY 90 capsule 2     VANISHPOINT SAFETY SYRINGE 25G X 1\" 3 ML MISC USE AS DIRECTED TO INJECT CYANOCOBALAMIN ONCE A MONTH 50 each 11     REVIEW OF SYSTEMS:  Unobtainable secondary " to cognitive impairment. Reports feeling fine    Objective:  /76   Pulse 68   Resp 18   SpO2 93%   Exam:  GENERAL APPEARANCE:  Alert, in no distress, cooperative  ENT:  Mouth and posterior oropharynx normal, moist mucous membranes, [place], cerumen bilat ear canals  EYES:  EOM, conjunctivae, lids, pupils and irises normal, PERRL  RESP:  respiratory effort and palpation of chest normal, lungs clear to auscultation , no respiratory distress  CV:  Palpation and auscultation of heart done , regular rate and rhythm, no murmur, rub, or gallop, no edema  ABDOMEN:  normal bowel sounds, soft, nontender, no hepatosplenomegaly or other masses, no guarding or rebound  M/S:   Gait and station normal  NEURO:   Cranial nerves 2-12 are normal tested and grossly at patient's baseline, speech clear  PSYCH:  insight and judgement impaired, memory impaired , affect and mood normal    Labs: cbc, bmp done this week stable    Most Recent 3 CBC's:  Recent Labs   Lab Test 09/25/18  0944 08/29/17  1521 09/06/16  1551   WBC 4.6 5.4 5.8   HGB 17.4 16.0 17.3   MCV 97 96 96    173 165     Most Recent 3 BMP's:  Recent Labs   Lab Test 09/25/18  0944 08/29/17  1521 09/06/16  1551    144 144   POTASSIUM 4.6 4.4 5.0   CHLORIDE 104 108 108   CO2 28 32 30   BUN 11 18 13   CR 1.13 1.14 1.08   ANIONGAP 9 4 6   MARIBEL 9.2 8.6 9.0   GLC 94 93 89       ASSESSMENT/PLAN:  Bilateral impacted cerumen  [place], not currently wearing hearing aid  1. Start debrox gtts to both ears, irrigate prn  2. Encourage to wear hearing aid  3. Consider pocket talker if hearing aid not effective    Late onset Alzheimer's disease without behavioral disturbance (H)  Memory loss. Mood stable. Behaviors stable except for sleeping into afternoons at times past couple weeks. Afebrile. Resp. Status stable. No changes in gait  1. Cont. Aricept, namenda  2. Monitor for insomnia  3. Staff to make sure resident hears, understands prompts in am to get dressed, come out for  breakfast  4. Reassess over next week      Electronically signed by:  LEIA Whyte CNP           Sincerely,        LEIA Whyte CNP

## 2020-10-14 NOTE — PROGRESS NOTES
"[place] [facility]  [place] Medical Record Number: [id number]  Place of Service where encounter took place: [facility]  [facility] ([facility]) [[id number]]  Chief Complaint   Patient presents with     Cerumen (impacted)     Dementia       HPI:    Aleksandar Decker is a 89 year old (8/28/1926), who is being seen today for an episodic care visit. HPI information obtained from: facility chart records, facility staff, patient report and [facility] chart review. Today's concern is: cerumen, alzheimer's. Has hearing loss. Lost one hearing aid.  Per staff, has difficulty hearing them, some difficulty following prompts. Has bilat cerumen upon exam.  Has memory loss due to alzheimer's.  Cont. On aricpet, namenda.  Sleeping ok at hs.  Per staff, has been sleeping in in am, sometimes not getting out of bed/room until the afternoon. Once aware, mood, behaviors stable, unchanged.  Did get up and agreed to get dressed, come out for breakfast this am with Nurse prompt.       Past Medical and Surgical History reviewed in Epic today.    MEDICATIONS:    Current Outpatient Medications   Medication Sig Dispense Refill     acetaminophen (TYLENOL) 500 MG tablet Take 1,000 mg by mouth every 8 hours as needed for mild pain       Cholecalciferol (VITAMIN D3) 50 MCG (2000 UT) CAPS Take 1 capsule by mouth every morning       cyanocobalamin (CYANOCOBALAMIN) 1000 MCG/ML injection INJECT 1ML (1000MCG) AS DIRECTED ONCE A MONTH 1 mL 97     donepezil (ARICEPT) 10 MG tablet Take 10 mg by mouth every morning       memantine (NAMENDA) 10 MG tablet Take 10 mg by mouth daily  180 tablet 3     omeprazole (PRILOSEC) 20 MG CR capsule TAKE ONE CAPSULE BY MOUTH DAILY 90 capsule 2     [employer] SAFETY SYRINGE 25G X 1\" 3 ML MISC USE AS DIRECTED TO INJECT CYANOCOBALAMIN ONCE A MONTH 50 each 11     REVIEW OF SYSTEMS:  Unobtainable secondary to cognitive impairment. Reports feeling fine    Objective:  /76   Pulse 68   Resp 18   SpO2 93% "   Exam:  GENERAL APPEARANCE:  Alert, in no distress, cooperative  ENT:  Mouth and posterior oropharynx normal, moist mucous membranes, [place], cerumen bilat ear canals  EYES:  EOM, conjunctivae, lids, pupils and irises normal, PERRL  RESP:  respiratory effort and palpation of chest normal, lungs clear to auscultation , no respiratory distress  CV:  Palpation and auscultation of heart done , regular rate and rhythm, no murmur, rub, or gallop, no edema  ABDOMEN:  normal bowel sounds, soft, nontender, no hepatosplenomegaly or other masses, no guarding or rebound  M/S:   Gait and station normal  NEURO:   Cranial nerves 2-12 are normal tested and grossly at patient's baseline, speech clear  PSYCH:  insight and judgement impaired, memory impaired , affect and mood normal    Labs: cbc, bmp done this week stable    Most Recent 3 CBC's:  Recent Labs   Lab Test 09/25/18  0944 08/29/17  1521 09/06/16  1551   WBC 4.6 5.4 5.8   HGB 17.4 16.0 17.3   MCV 97 96 96    173 165     Most Recent 3 BMP's:  Recent Labs   Lab Test 09/25/18  0944 08/29/17  1521 09/06/16  1551    144 144   POTASSIUM 4.6 4.4 5.0   CHLORIDE 104 108 108   CO2 28 32 30   BUN 11 18 13   CR 1.13 1.14 1.08   ANIONGAP 9 4 6   MARIBEL 9.2 8.6 9.0   GLC 94 93 89       ASSESSMENT/PLAN:  Bilateral impacted cerumen  [place], not currently wearing hearing aid  1. Start debrox gtts to both ears, irrigate prn  2. Encourage to wear hearing aid  3. Consider pocket talker if hearing aid not effective    Late onset Alzheimer's disease without behavioral disturbance (H)  Memory loss. Mood stable. Behaviors stable except for sleeping into afternoons at times past couple weeks. Afebrile. Resp. Status stable. No changes in gait  1. Cont. Aricept, namenda  2. Monitor for insomnia  3. Staff to make sure resident hears, understands prompts in am to get dressed, come out for breakfast  4. Reassess over next week      Electronically signed by:  LEIA Whyte CNP

## 2020-10-26 ENCOUNTER — ASSISTED LIVING VISIT (OUTPATIENT)
Dept: GERIATRICS | Facility: CLINIC | Age: 85
End: 2020-10-26
Payer: MEDICARE

## 2020-10-26 VITALS
HEART RATE: 68 BPM | RESPIRATION RATE: 18 BRPM | SYSTOLIC BLOOD PRESSURE: 116 MMHG | OXYGEN SATURATION: 92 % | DIASTOLIC BLOOD PRESSURE: 72 MMHG

## 2020-10-26 DIAGNOSIS — R53.83 LETHARGY: Primary | ICD-10-CM

## 2020-10-26 NOTE — LETTER
"    10/26/2020        RE: Aleksandar Decker  C/o Arelis Love  [address] [address]        [place] [facility]  [place] Medical Record Number: [id number]  Place of Service where encounter took place: [facility]  [facility] ([facility]) [[id number]]  Chief Complaint   Patient presents with     Fatigue       HPI:    Aleksandar Decker is a 89 year old (8/28/1926), who is being seen today for an episodic care visit. HPI information obtained from: facility chart records, facility staff, patient report and [facility] chart review. Today's concern is: lethargy.  For past month noted to sleep in in ams-at times refuses to get OOB until after lunch.  Has hearing loss. Per staff, refuses prompt in am to get up for the day.  No reports of insomnia.  Does take am meds ok. Appears alert when awakened by staff in am. Does miss meals at times due to sleeping in.       Past Medical and Surgical History reviewed in Epic today.    MEDICATIONS:    Current Outpatient Medications   Medication Sig Dispense Refill     acetaminophen (TYLENOL) 500 MG tablet Take 1,000 mg by mouth every 8 hours as needed for mild pain       Cholecalciferol (VITAMIN D3) 50 MCG (2000 UT) CAPS Take 1 capsule by mouth every morning       cyanocobalamin (CYANOCOBALAMIN) 1000 MCG/ML injection INJECT 1ML (1000MCG) AS DIRECTED ONCE A MONTH 1 mL 97     donepezil (ARICEPT) 10 MG tablet Take 10 mg by mouth every morning       memantine (NAMENDA) 10 MG tablet Take 10 mg by mouth daily  180 tablet 3     omeprazole (PRILOSEC) 20 MG CR capsule TAKE ONE CAPSULE BY MOUTH DAILY 90 capsule 2     VANISHPOINT SAFETY SYRINGE 25G X 1\" 3 ML MISC USE AS DIRECTED TO INJECT CYANOCOBALAMIN ONCE A MONTH 50 each 11     REVIEW OF SYSTEMS:  Unobtainable secondary to cognitive impairment. Reports feeling fine    Objective:  /72   Pulse 68   Resp 18   SpO2 92%   Exam:  GENERAL APPEARANCE:  Alert, in no distress, cooperative  ENT:  Mouth and posterior " oropharynx normal, moist mucous membranes, [place]  EYES:  EOM, conjunctivae, lids, pupils and irises normal  RESP:  respiratory effort and palpation of chest normal, lungs clear to auscultation , no respiratory distress  CV:  Palpation and auscultation of heart done , regular rate and rhythm, no murmur, rub, or gallop, no edema  ABDOMEN:  normal bowel sounds, soft, nontender, no hepatosplenomegaly or other masses, no guarding or rebound  M/S:   muscle strength 5/5 all 4 ext., normal tone  NEURO:   Cranial nerves 2-12 are normal tested and grossly at patient's baseline, speech clear  PSYCH:  insight and judgement impaired, memory impaired , affect abnormal -flat    Labs:     Most Recent 3 CBC's:  Recent Labs   Lab Test 09/25/18  0944 08/29/17  1521 09/06/16  1551   WBC 4.6 5.4 5.8   HGB 17.4 16.0 17.3   MCV 97 96 96    173 165     Most Recent 3 BMP's:  Recent Labs   Lab Test 09/25/18  0944 08/29/17  1521 09/06/16  1551    144 144   POTASSIUM 4.6 4.4 5.0   CHLORIDE 104 108 108   CO2 28 32 30   BUN 11 18 13   CR 1.13 1.14 1.08   ANIONGAP 9 4 6   MARIBEL 9.2 8.6 9.0   GLC 94 93 89       ASSESSMENT/PLAN:  Lethargy  Cont. To sleep in in ams. No reports of increased fatigue later in day. No reports of insomnia.  1. Cont. Aricept, namenda  2. Staff to cont. To prompt to get dressed in am, come out for breakfast  3. Reassess over next couple days  4. Monitor for depression      Electronically signed by:  LEIA Whyte CNP           Sincerely,        LEIA Whyte CNP

## 2020-10-26 NOTE — PROGRESS NOTES
"[place] [facility]  [place] Medical Record Number: [id number]  Place of Service where encounter took place: [facility]  [facility] ([facility]) [[id number]]  Chief Complaint   Patient presents with     Fatigue       HPI:    Aleksandar Decker is a 89 year old (8/28/1926), who is being seen today for an episodic care visit. HPI information obtained from: facility chart records, facility staff, patient report and [facility] chart review. Today's concern is: lethargy.  For past month noted to sleep in in ams-at times refuses to get OOB until after lunch.  Has hearing loss. Per staff, refuses prompt in am to get up for the day.  No reports of insomnia.  Does take am meds ok. Appears alert when awakened by staff in am. Does miss meals at times due to sleeping in.       Past Medical and Surgical History reviewed in Epic today.    MEDICATIONS:    Current Outpatient Medications   Medication Sig Dispense Refill     acetaminophen (TYLENOL) 500 MG tablet Take 1,000 mg by mouth every 8 hours as needed for mild pain       Cholecalciferol (VITAMIN D3) 50 MCG (2000 UT) CAPS Take 1 capsule by mouth every morning       cyanocobalamin (CYANOCOBALAMIN) 1000 MCG/ML injection INJECT 1ML (1000MCG) AS DIRECTED ONCE A MONTH 1 mL 97     donepezil (ARICEPT) 10 MG tablet Take 10 mg by mouth every morning       memantine (NAMENDA) 10 MG tablet Take 10 mg by mouth daily  180 tablet 3     omeprazole (PRILOSEC) 20 MG CR capsule TAKE ONE CAPSULE BY MOUTH DAILY 90 capsule 2     [employer] SAF[employer] SYRINGE 25G X 1\" 3 ML MISC USE AS DIRECTED TO INJECT CYANOCOBALAMIN ONCE A MONTH 50 each 11     REVIEW OF SYSTEMS:  Unobtainable secondary to cognitive impairment. Reports feeling fine    Objective:  /72   Pulse 68   Resp 18   SpO2 92%   Exam:  GENERAL APPEARANCE:  Alert, in no distress, cooperative  ENT:  Mouth and posterior oropharynx normal, moist mucous membranes, [place]  EYES:  EOM, conjunctivae, lids, pupils and irises " normal  RESP:  respiratory effort and palpation of chest normal, lungs clear to auscultation , no respiratory distress  CV:  Palpation and auscultation of heart done , regular rate and rhythm, no murmur, rub, or gallop, no edema  ABDOMEN:  normal bowel sounds, soft, nontender, no hepatosplenomegaly or other masses, no guarding or rebound  M/S:   muscle strength 5/5 all 4 ext., normal tone  NEURO:   Cranial nerves 2-12 are normal tested and grossly at patient's baseline, speech clear  PSYCH:  insight and judgement impaired, memory impaired , affect abnormal -flat    Labs:     Most Recent 3 CBC's:  Recent Labs   Lab Test 09/25/18  0944 08/29/17  1521 09/06/16  1551   WBC 4.6 5.4 5.8   HGB 17.4 16.0 17.3   MCV 97 96 96    173 165     Most Recent 3 BMP's:  Recent Labs   Lab Test 09/25/18  0944 08/29/17  1521 09/06/16  1551    144 144   POTASSIUM 4.6 4.4 5.0   CHLORIDE 104 108 108   CO2 28 32 30   BUN 11 18 13   CR 1.13 1.14 1.08   ANIONGAP 9 4 6   MARIBEL 9.2 8.6 9.0   GLC 94 93 89       ASSESSMENT/PLAN:  Lethargy  Cont. To sleep in in ams. No reports of increased fatigue later in day. No reports of insomnia.  1. Cont. Aricept, namenda  2. Staff to cont. To prompt to get dressed in am, come out for breakfast  3. Reassess over next couple days  4. Monitor for depression      Electronically signed by:  LEIA Whyte CNP

## 2020-11-13 DIAGNOSIS — R52 PAIN: Primary | ICD-10-CM

## 2020-11-16 RX ORDER — PSEUDOEPHED/ACETAMINOPH/DIPHEN 30MG-500MG
TABLET ORAL
Qty: 60 TABLET | Refills: 11 | Status: SHIPPED | OUTPATIENT
Start: 2020-11-16 | End: 2021-09-27

## 2020-11-30 DIAGNOSIS — E56.9 VITAMIN DEFICIENCY: ICD-10-CM

## 2020-11-30 DIAGNOSIS — R41.3 MEMORY LOSS: Primary | ICD-10-CM

## 2020-11-30 DIAGNOSIS — K21.9 GASTROESOPHAGEAL REFLUX DISEASE WITHOUT ESOPHAGITIS: ICD-10-CM

## 2020-12-01 RX ORDER — CHOLECALCIFEROL (VITAMIN D3) 50 MCG
TABLET ORAL
Qty: 28 TABLET | Status: SHIPPED | OUTPATIENT
Start: 2020-12-01

## 2020-12-01 RX ORDER — DONEPEZIL HYDROCHLORIDE 10 MG/1
TABLET, FILM COATED ORAL
Qty: 28 TABLET | Status: SHIPPED | OUTPATIENT
Start: 2020-12-01

## 2020-12-01 RX ORDER — MEMANTINE HYDROCHLORIDE 10 MG/1
TABLET ORAL
Qty: 28 TABLET | Refills: 97 | Status: SHIPPED | OUTPATIENT
Start: 2020-12-01 | End: 2021-03-08 | Stop reason: SINTOL

## 2021-01-11 ENCOUNTER — RECORDS - HEALTHEAST (OUTPATIENT)
Dept: LAB | Facility: CLINIC | Age: 86
End: 2021-01-11

## 2021-01-11 DIAGNOSIS — H61.23 BILATERAL IMPACTED CERUMEN: Primary | ICD-10-CM

## 2021-01-12 LAB
ANION GAP SERPL CALCULATED.3IONS-SCNC: 9 MMOL/L (ref 5–18)
BASOPHILS # BLD AUTO: 0 THOU/UL (ref 0–0.2)
BASOPHILS NFR BLD AUTO: 1 % (ref 0–2)
BUN SERPL-MCNC: 14 MG/DL (ref 8–28)
CALCIUM SERPL-MCNC: 8.9 MG/DL (ref 8.5–10.5)
CHLORIDE BLD-SCNC: 105 MMOL/L (ref 98–107)
CO2 SERPL-SCNC: 28 MMOL/L (ref 22–31)
CREAT SERPL-MCNC: 1.2 MG/DL (ref 0.7–1.3)
EOSINOPHIL # BLD AUTO: 0.1 THOU/UL (ref 0–0.4)
EOSINOPHIL NFR BLD AUTO: 3 % (ref 0–6)
ERYTHROCYTE [DISTWIDTH] IN BLOOD BY AUTOMATED COUNT: 13 % (ref 11–14.5)
GFR SERPL CREATININE-BSD FRML MDRD: 56 ML/MIN/1.73M2
GLUCOSE BLD-MCNC: 69 MG/DL (ref 70–125)
HCT VFR BLD AUTO: 50.9 % (ref 40–54)
HGB BLD-MCNC: 16.8 G/DL (ref 14–18)
IMM GRANULOCYTES # BLD: 0 THOU/UL
IMM GRANULOCYTES NFR BLD: 0 %
LYMPHOCYTES # BLD AUTO: 1 THOU/UL (ref 0.8–4.4)
LYMPHOCYTES NFR BLD AUTO: 21 % (ref 20–40)
MCH RBC QN AUTO: 32 PG (ref 27–34)
MCHC RBC AUTO-ENTMCNC: 33 G/DL (ref 32–36)
MCV RBC AUTO: 97 FL (ref 80–100)
MONOCYTES # BLD AUTO: 0.4 THOU/UL (ref 0–0.9)
MONOCYTES NFR BLD AUTO: 9 % (ref 2–10)
NEUTROPHILS # BLD AUTO: 3.2 THOU/UL (ref 2–7.7)
NEUTROPHILS NFR BLD AUTO: 66 % (ref 50–70)
PLATELET # BLD AUTO: 177 THOU/UL (ref 140–440)
PMV BLD AUTO: 12.2 FL (ref 8.5–12.5)
POTASSIUM BLD-SCNC: 4.4 MMOL/L (ref 3.5–5)
RBC # BLD AUTO: 5.25 MILL/UL (ref 4.4–6.2)
SODIUM SERPL-SCNC: 142 MMOL/L (ref 136–145)
WBC: 4.9 THOU/UL (ref 4–11)

## 2021-01-12 RX ORDER — ADHESIVE BANDAGE 3/4"
BANDAGE TOPICAL
Qty: 15 ML | Refills: 97 | Status: SHIPPED | OUTPATIENT
Start: 2021-01-12

## 2021-01-28 ENCOUNTER — RECORDS - HEALTHEAST (OUTPATIENT)
Dept: LAB | Facility: CLINIC | Age: 86
End: 2021-01-28

## 2021-01-28 LAB
SARS-COV-2 PCR COMMENT: NORMAL
SARS-COV-2 RNA SPEC QL NAA+PROBE: NEGATIVE
SARS-COV-2 VIRUS SPECIMEN SOURCE: NORMAL

## 2021-02-05 DIAGNOSIS — E53.8 VITAMIN B 12 DEFICIENCY: ICD-10-CM

## 2021-02-08 RX ORDER — CYANOCOBALAMIN 1000 UG/ML
INJECTION, SOLUTION INTRAMUSCULAR; SUBCUTANEOUS
Qty: 1 ML | Refills: 97 | Status: SHIPPED | OUTPATIENT
Start: 2021-02-08

## 2021-02-08 RX ORDER — SYRINGE W-NEEDLE,DISPOSAB,3 ML 25GX5/8"
SYRINGE, EMPTY DISPOSABLE MISCELLANEOUS
Qty: 1 EACH | Refills: 11 | Status: SHIPPED | OUTPATIENT
Start: 2021-02-08

## 2021-02-18 ENCOUNTER — RECORDS - HEALTHEAST (OUTPATIENT)
Dept: LAB | Facility: CLINIC | Age: 86
End: 2021-02-18

## 2021-02-25 ENCOUNTER — RECORDS - HEALTHEAST (OUTPATIENT)
Dept: LAB | Facility: CLINIC | Age: 86
End: 2021-02-25

## 2021-03-08 ENCOUNTER — ASSISTED LIVING VISIT (OUTPATIENT)
Dept: GERIATRICS | Facility: CLINIC | Age: 86
End: 2021-03-08
Payer: MEDICARE

## 2021-03-08 VITALS — TEMPERATURE: 97.8 F | BODY MASS INDEX: 26 KG/M2 | OXYGEN SATURATION: 96 % | WEIGHT: 166 LBS

## 2021-03-08 DIAGNOSIS — E53.8 VITAMIN B 12 DEFICIENCY: ICD-10-CM

## 2021-03-08 DIAGNOSIS — F02.80 LATE ONSET ALZHEIMER'S DISEASE WITHOUT BEHAVIORAL DISTURBANCE (H): ICD-10-CM

## 2021-03-08 DIAGNOSIS — M51.379 DEGENERATION OF LUMBAR OR LUMBOSACRAL INTERVERTEBRAL DISC: ICD-10-CM

## 2021-03-08 DIAGNOSIS — K21.9 GASTROESOPHAGEAL REFLUX DISEASE WITHOUT ESOPHAGITIS: Primary | ICD-10-CM

## 2021-03-08 DIAGNOSIS — G30.1 LATE ONSET ALZHEIMER'S DISEASE WITHOUT BEHAVIORAL DISTURBANCE (H): ICD-10-CM

## 2021-03-08 NOTE — LETTER
3/8/2021        RE: Aleksandar Decker  C/o Arelis Love  [address] [address]        Aleksandar Decker is a 89 year old male seen March 8, 2021 at [facility] unit where he has resided for >one year (admit 11/2019) seen for initial visit.   Pt is seen on the unit up to chair.  He remains ambulatory without device.  He is pleasant and conversational, states he feels well. Can be repetitive, but social graces intact    AL Nursing staff reports patient was in bed all day yesterday, for no apparent reason.   Up today.   Evidently this occurs a couple of times a week.   No behavioral symptoms reported.        By chart review, patient has had memory loss since 2009.   I [facility] him in my [facility] 1906-[facility]   Started on donepezil and memantine ten years ago.   Appears to tolerate them okay, and has had a remarkably gradual progression of his dementia    Past Medical History:   Diagnosis Date     Dementia of the Alzheimer's type (H)     dr Jacobo and neurologist: Kul[facility]     Esophageal reflux      Fever and other physiologic disturbances of temperature regulation     2005 ? tick related illness w fever;; resolved       GERD (gastroesophageal reflux disease)     better with ranitidine     Hearing loss     uses aides      Hyperlipidemia LDL goal <160      Hypothyroidism      Lumbago     chronic , non radicular     Vitamin B 12 deficiency      Past Surgical History:   Procedure Laterality Date     HC COLONOSCOPY THRU STOMA, DIAGNOSTIC      had colonoscopy in [place] 2007 (normal)      [facility] NONSPECIFIC PROCEDURE      appy      SH:  .   Previously [facility] at [facility].  He has 3 daughters: Carla, Kyra and Arelis.    He can't remember where they live.   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   Non smoker    ROS:  Ambulatory without device  MoCA 18/30  SLUMS 5/30 1/2019  Wt Readings from Last 5 Encounters:   03/08/21 75.3 kg (166 lb)   01/13/20 74.8  kg (165 lb)   11/25/19 74.8 kg (165 lb)   09/25/18 74.8 kg (165 lb)   08/01/18 75.1 kg (165 lb 9.6 oz)      EXAM:   NAD  Temp 97.8  F (36.6  C)   Wt 75.3 kg (166 lb)   SpO2 96%   BMI 26.00 kg/m     Neck supple without adenopathy  Lungs with decreased BS, no rales or wheeze  Heart RRR s1s2, 3/6 JOSH @LSB andacross precordium  Abd soft, NT, no distention, +BS  Ext without edema  Neuro: preserved language and mobility, repetitive, some disorientation.   +right hand resting tremor, no stiffness or rigidity  Psych: affect okay, cheery.     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     Low functional status and STML with preserved language and mobility   Plan: AL Memory Care unit for support with meds, meals, activity, secure unit    Continue PTA donepezil 10 mg/day   Due to sedation and lack of ongoing effectiveness, will discontinue memantine    (M51.37) CHRONIC LOW BACK PAIN  Comment: intermittent   Plan: prn acetaminophen, local measures    (E53.8) Vitamin B 12 deficiency  Plan: continue monthly B12 1000 units SQ     Talked with daughter Arelis by phone about plan of care and medication change, and she is in agreement.     Violeta Jacobo MD         Sincerely,        Violeta Jacobo MD

## 2021-03-16 ENCOUNTER — ASSISTED LIVING VISIT (OUTPATIENT)
Dept: GERIATRICS | Facility: CLINIC | Age: 86
End: 2021-03-16
Payer: MEDICARE

## 2021-03-16 VITALS
HEART RATE: 63 BPM | BODY MASS INDEX: 26 KG/M2 | SYSTOLIC BLOOD PRESSURE: 140 MMHG | RESPIRATION RATE: 16 BRPM | OXYGEN SATURATION: 93 % | DIASTOLIC BLOOD PRESSURE: 88 MMHG | WEIGHT: 166 LBS

## 2021-03-16 DIAGNOSIS — R25.1 TREMOR: ICD-10-CM

## 2021-03-16 DIAGNOSIS — F02.80 LATE ONSET ALZHEIMER'S DISEASE WITHOUT BEHAVIORAL DISTURBANCE (H): ICD-10-CM

## 2021-03-16 DIAGNOSIS — G30.1 LATE ONSET ALZHEIMER'S DISEASE WITHOUT BEHAVIORAL DISTURBANCE (H): ICD-10-CM

## 2021-03-16 DIAGNOSIS — H61.23 BILATERAL IMPACTED CERUMEN: Primary | ICD-10-CM

## 2021-03-16 NOTE — PROGRESS NOTES
"[place] [facility]  [place] Medical Record Number: [id number]  Place of Service where encounter took place: [facility]  [facility] ([facility]) [[id number]]  Chief Complaint   Patient presents with     Fatigue     Cerumen (impacted)     HPI:    Aleksandar Decker  is a 89 year old (8/28/1926), who is being seen today for an episodic care visit.  HPI information obtained from: facility chart records, facility staff, patient report and [facility] chart review. Today's concern is: cerumen, alzheimer's, tremor. Has recurrent cerumen bilat. Ear canals. [place].  For past week has had debrox gtts to both ears.  Has memory loss due to alzheimer's. memantine dc'd last week due to am sleepiness.  Per staff, no sig. Changes in mood, behaviors since memantine dc'd. Does cont. To sleep in some mornings.  Po intake gen. Stable.  No reports of insomnia.  Has ongoing resting bilat UE tremor.  Noted today during exam.  Per staff, not currently having increased difficulty with ADLs due to tremor.         Past Medical and Surgical History reviewed in Epic today.    MEDICATIONS:    Current Outpatient Medications   Medication Sig Dispense Refill     ACETAMINOPHEN EXTRA STRENGTH 500 MG tablet TAKE TWO TABLETS (1000MG) BY MOUTH EVERY 8 HOURS AS NEEDED 60 tablet 11     Cholecalciferol (VITAMIN D3) 50 MCG (2000 UT) CAPS Take 1 capsule by mouth every morning       Cholecalciferol (VITAMIN D3) 50 MCG (2000 UT) TABS TAKE 1 TABLET BY MOUTH EVERY MORNING 28 tablet PRN     cyanocobalamin (CYANOCOBALAMIN) 1000 MCG/ML injection INJECT 1 ML (1000 MCG) AS DIRECTED ONCE A MONTH 1 mL 97     donepezil (ARICEPT) 10 MG tablet TAKE 1 TABLET BY MOUTH EVERY MORNING 28 tablet PRN     EAR DROPS 6.5 % otic solution GIVE 4 DROPS IN BOTH EARS FOR 5 DAYS EVERY MONTH;IRRIGATE AS NEEDED AFTER DROPS 15 mL 97     omeprazole (PRILOSEC) 20 MG DR capsule TAKE 1 CAPSULE BY MOUTH EVERY MORNING 28 capsule PRN     [employer] SAFETY SYRINGE 25G X 1\" 3 ML MISC USE " AS DIRECTED TO INJECT CYANOCOBALAMIN ONCE A MONTH 1 each 11     REVIEW OF SYSTEMS:  Unobtainable secondary to cognitive impairment. Reports feeling fine today    Objective:  BP (!) 140/88   Pulse 63   Resp 16   Wt 75.3 kg (166 lb)   SpO2 93%   BMI 26.00 kg/m    Exam:  GENERAL APPEARANCE:  in no distress, appears healthy, cooperative  ENT:  Mouth and posterior oropharynx normal, moist mucous membranes, [place], bilat ear canals free of cerumen s/p removal with curette  EYES:  EOM, conjunctivae, lids, pupils and irises normal, PERRL  RESP:  respiratory effort and palpation of chest normal, lungs clear to auscultation , no respiratory distress  CV:  Palpation and auscultation of heart done , no edema, rate-normal, grade 2/6 murmur  ABDOMEN:  normal bowel sounds, soft, nontender, no hepatosplenomegaly or other masses, no guarding or rebound  M/S:   Gait and station normal  muscle strength 5/5 all 4 ext  NEURO:   Cranial nerves 2-12 are normal tested and grossly at patient's baseline, speech clear. bilat. UE resting tremor  PSYCH:  insight and judgement impaired, memory impaired , affect and mood normal    Labs:   Recent labs in EPIC reviewed by me today.     ASSESSMENT/PLAN:  Bilateral impacted cerumen  Cerumen cleared s/p removal with curette today. Completed debrox gtts course  1. Monitor for further cerumen  2. Plan to repeat 1 week course of debrox q 3 mos  3. Irrigate prn  4. 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 Mood, behaviors gen. Stable. Ongoing sleeping in some ams. memantine dc'd last week  1. Cont. aricept  2. Cont. To encourage to get up for breakfast in am  3. Cont. To engage in act. Throughout the day  4. Follow po intake, wt.s    Tremor  Ongoing, somewhat increased since last visit.  1. Monitor for worsening tremor  2. Monitor for reports of increased diff. With ADLs  3. For above s/s, would consider PT/OT  referral      Electronically signed by:  LEIA Whyte CNP

## 2021-03-16 NOTE — LETTER
3/16/2021        RE: Aleksandar Decker  C/o Arelis Love  [address] [address]        [place] [facility]  [place] Medical Record Number: [id number]  Place of Service where encounter took place: [facility] [employer]  [facility] ([facility]) [[id number]]  Chief Complaint   Patient presents with     Fatigue     Cerumen (impacted)     HPI:    Aleksandar Decker  is a 89 year old (8/28/1926), who is being seen today for an episodic care visit.  HPI information obtained from: facility chart records, facility staff, patient report and [facility] chart review. Today's concern is: cerumen, alzheimer's, tremor. Has recurrent cerumen bilat. Ear canals. [place].  For past week has had debrox gtts to both ears.  Has memory loss due to alzheimer's. memantine dc'd last week due to am sleepiness.  Per staff, no sig. Changes in mood, behaviors since memantine dc'd. Does cont. To sleep in some mornings.  Po intake gen. Stable.  No reports of insomnia.  Has ongoing resting bilat UE tremor.  Noted today during exam.  Per staff, not currently having increased difficulty with ADLs due to tremor.         Past Medical and Surgical History reviewed in Epic today.    MEDICATIONS:    Current Outpatient Medications   Medication Sig Dispense Refill     ACETAMINOPHEN EXTRA STRENGTH 500 MG tablet TAKE TWO TABLETS (1000MG) BY MOUTH EVERY 8 HOURS AS NEEDED 60 tablet 11     Cholecalciferol (VITAMIN D3) 50 MCG (2000 UT) CAPS Take 1 capsule by mouth every morning       Cholecalciferol (VITAMIN D3) 50 MCG (2000 UT) TABS TAKE 1 TABLET BY MOUTH EVERY MORNING 28 tablet PRN     cyanocobalamin (CYANOCOBALAMIN) 1000 MCG/ML injection INJECT 1 ML (1000 MCG) AS DIRECTED ONCE A MONTH 1 mL 97     donepezil (ARICEPT) 10 MG tablet TAKE 1 TABLET BY MOUTH EVERY MORNING 28 tablet PRN     EAR DROPS 6.5 % otic solution GIVE 4 DROPS IN BOTH EARS FOR 5 DAYS EVERY MONTH;IRRIGATE AS NEEDED AFTER DROPS 15 mL 97     omeprazole (PRILOSEC) 20 MG DR capsule  "TAKE 1 CAPSULE BY MOUTH EVERY MORNING 28 capsule PRN     [employer] SAFETY SYRINGE 25G X 1\" 3 ML MISC USE AS DIRECTED TO INJECT CYANOCOBALAMIN ONCE A MONTH 1 each 11     REVIEW OF SYSTEMS:  Unobtainable secondary to cognitive impairment. Reports feeling fine today    Objective:  BP (!) 140/88   Pulse 63   Resp 16   Wt 75.3 kg (166 lb)   SpO2 93%   BMI 26.00 kg/m    Exam:  GENERAL APPEARANCE:  in no distress, appears healthy, cooperative  ENT:  Mouth and posterior oropharynx normal, moist mucous membranes, [place], bilat ear canals free of cerumen s/p removal with curette  EYES:  EOM, conjunctivae, lids, pupils and irises normal, PERRL  RESP:  respiratory effort and palpation of chest normal, lungs clear to auscultation , no respiratory distress  CV:  Palpation and auscultation of heart done , no edema, rate-normal, grade 2/6 murmur  ABDOMEN:  normal bowel sounds, soft, nontender, no hepatosplenomegaly or other masses, no guarding or rebound  M/S:   Gait and station normal  muscle strength 5/5 all 4 ext  NEURO:   Cranial nerves 2-12 are normal tested and grossly at patient's baseline, speech clear. bilat. UE resting tremor  PSYCH:  insight and judgement impaired, memory impaired , affect and mood normal    Labs:   Recent labs in [facility] reviewed by me today.     ASSESSMENT/PLAN:  Bilateral impacted cerumen  Cerumen cleared s/p removal with curette today. Completed debrox gtts course  1. Monitor for further cerumen  2. Plan to repeat 1 week course of debrox q 3 mos  3. Irrigate prn  4. 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 Mood, behaviors gen. Stable. Ongoing sleeping in some ams. memantine dc'd last week  1. Cont. aricept  2. Cont. To encourage to get up for breakfast in am  3. Cont. To engage in act. Throughout the day  4. Follow po intake, wt.s    Tremor  Ongoing, somewhat increased since last visit.  1. Monitor for worsening " tremor  2. Monitor for reports of increased diff. With ADLs  3. For above s/s, would consider PT/OT referral      Electronically signed by:  LEIA Whyte CNP               Sincerely,        LEIA Whyte CNP

## 2021-04-02 ENCOUNTER — RECORDS - HEALTHEAST (OUTPATIENT)
Dept: LAB | Facility: CLINIC | Age: 86
End: 2021-04-02

## 2021-05-08 ENCOUNTER — RECORDS - HEALTHEAST (OUTPATIENT)
Dept: LAB | Facility: CLINIC | Age: 86
End: 2021-05-08

## 2021-05-14 ENCOUNTER — ASSISTED LIVING VISIT (OUTPATIENT)
Dept: GERIATRICS | Facility: CLINIC | Age: 86
End: 2021-05-14
Payer: MEDICARE

## 2021-05-14 VITALS
SYSTOLIC BLOOD PRESSURE: 139 MMHG | DIASTOLIC BLOOD PRESSURE: 77 MMHG | OXYGEN SATURATION: 93 % | RESPIRATION RATE: 18 BRPM | HEART RATE: 67 BPM

## 2021-05-14 DIAGNOSIS — G30.1 LATE ONSET ALZHEIMER'S DISEASE WITH BEHAVIORAL DISTURBANCE (H): ICD-10-CM

## 2021-05-14 DIAGNOSIS — R25.1 TREMOR: ICD-10-CM

## 2021-05-14 DIAGNOSIS — F02.818 LATE ONSET ALZHEIMER'S DISEASE WITH BEHAVIORAL DISTURBANCE (H): ICD-10-CM

## 2021-05-14 DIAGNOSIS — R53.83 LETHARGY: Primary | ICD-10-CM

## 2021-05-14 RX ORDER — MEMANTINE HYDROCHLORIDE 5 MG/1
5 TABLET ORAL DAILY
COMMUNITY
End: 2021-06-07

## 2021-05-14 NOTE — PROGRESS NOTES
[place] [facility]  [place] Medical Record Number:  [id number]  Place of Service where encounter took place:  [facility]  [facility] ([facility]) [[id number]]  Chief Complaint   Patient presents with     Fatigue       HPI:    Aleksandar Decker  is a 89 year old (8/28/1926), who is being seen today for an episodic care visit.  HPI information obtained from: facility chart records, facility staff, patient report, [facility] chart review and family/first contact dtr report. Today's concern is:lethargy, alzheimer's, tremor. Has h/o am sleepiness, lethargy, refusing to come out for breakfast, sleeping in. Given daytime sleepiness, had namenda dose dc'd 3/8/21.  Cont. On aricept 10 mg every day for alzheimer's. Staff reports resident more consistently getting up, eating breakfast.  Has also spent sig. More time out of apt., participating in activities.  No increase in resting tremor. Staff has however reported sexually inappropriate verbalizations.  Has also needed redirection from staff for inappropriate touching of female peer.        Past Medical and Surgical History reviewed in Epic today.    MEDICATIONS:    Current Outpatient Medications   Medication Sig Dispense Refill     memantine (NAMENDA) 5 MG tablet Take 5 mg by mouth daily       ACETAMINOPHEN EXTRA STRENGTH 500 MG tablet TAKE TWO TABLETS (1000MG) BY MOUTH EVERY 8 HOURS AS NEEDED 60 tablet 11     Cholecalciferol (VITAMIN D3) 50 MCG (2000 UT) CAPS Take 1 capsule by mouth every morning       Cholecalciferol (VITAMIN D3) 50 MCG (2000 UT) TABS TAKE 1 TABLET BY MOUTH EVERY MORNING 28 tablet PRN     cyanocobalamin (CYANOCOBALAMIN) 1000 MCG/ML injection INJECT 1 ML (1000 MCG) AS DIRECTED ONCE A MONTH 1 mL 97     donepezil (ARICEPT) 10 MG tablet TAKE 1 TABLET BY MOUTH EVERY MORNING 28 tablet PRN     EAR DROPS 6.5 % otic solution GIVE 4 DROPS IN BOTH EARS FOR 5 DAYS EVERY MONTH;IRRIGATE AS NEEDED AFTER DROPS 15 mL 97     omeprazole (PRILOSEC) 20 MG   "capsule TAKE 1 CAPSULE BY MOUTH EVERY MORNING 28 capsule PRN     [facility] SAFETY SYRINGE 25G X 1\" 3 ML MISC USE AS DIRECTED TO INJECT CYANOCOBALAMIN ONCE A MONTH 1 each 11         REVIEW OF SYSTEMS:  Unobtainable secondary to cognitive impairment. Reports feeling good today    Objective:  /77   Pulse 67   Resp 18   SpO2 93%   Exam:  GENERAL APPEARANCE:  Alert, in no distress, appears healthy  ENT:  Mouth and posterior oropharynx normal, moist mucous membranes, [place]  EYES:  EOM, conjunctivae, lids, pupils and irises normal, PERRL  RESP:  respiratory effort and palpation of chest normal, lungs clear to auscultation , no respiratory distress  CV:  Palpation and auscultation of heart done , regular rate and rhythm, no murmur, rub, or gallop, peripheral edema trace+ in LEs  ABDOMEN:  normal bowel sounds, soft, nontender, no hepatosplenomegaly or other masses, no guarding or rebound  M/S:   Gait and station normal  muscle strength 5/5 all 4 ext.  NEURO:   Cranial nerves 2-12 are normal tested and grossly at patient's baseline, speech clear  PSYCH:  insight and judgement impaired, memory impaired , affect and mood normal    Labs:     Most Recent 3 BMP's:  Recent Labs   Lab Test 09/25/18  0944 08/29/17  1521 09/06/16  1551    144 144   POTASSIUM 4.6 4.4 5.0   CHLORIDE 104 108 108   CO2 28 32 30   BUN 11 18 13   CR 1.13 1.14 1.08   ANIONGAP 9 4 6   MARIBEL 9.2 8.6 9.0   GLC 94 93 89       ASSESSMENT/PLAN:  Lethargy  Improved. Out for breakfast, increased activity participation  1. Cont. To invite to activities  2. Monitor am po intake  3. Reassess over next week with restarting of namenda at lower dose of 5 mg qam  4. Bmp in next 2-4 mos, hgb in next 2-4 mos    Late onset Alzheimer's disease with behavioral disturbance (H)  Memory loss, increased act. Participation. Increased freq. Of sexually inappropriate behaviors  1. Cont. aricept  2. Spoke with dtr, plan to restart nemanda 5 mg qam  3. Reassess over next " week. For no improvement of sexually inappropriate behaviors, may increase namenda to 5 mg bid  4. Cont. Secure memory care unit    Tremor  No recent increased s/s. Tremor not present during exam  1. Monitor for worsening tremor, affecting ADLs  2. Monitor for changes in gait, slowed gait  3. Reassess over next few weeks      Electronically signed by:  LEIA Whyte CNP

## 2021-05-14 NOTE — LETTER
5/14/2021        RE: Aleksandar Decker  C/o Arelis Love  [address] [address]        [facility]  [place] Medical Record Number:  [id number]  Place of Service where encounter took place:  [facility]  [facility] ([facility]) [[id number]]  Chief Complaint   Patient presents with     Fatigue       HPI:    Aleksandar Decker  is a 89 year old (8/28/1926), who is being seen today for an episodic care visit.  HPI information obtained from: facility chart records, facility staff, patient report, [facility] chart review and family/first contact dtr report. Today's concern is:lethargy, alzheimer's, tremor. Has h/o am sleepiness, lethargy, refusing to come out for breakfast, sleeping in. Given daytime sleepiness, had namenda dose dc'd 3/8/21.  Cont. On aricept 10 mg every day for alzheimer's. Staff reports resident more consistently getting up, eating breakfast.  Has also spent sig. More time out of apt., participating in activities.  No increase in resting tremor. Staff has however reported sexually inappropriate verbalizations.  Has also needed redirection from staff for inappropriate touching of female peer.        Past Medical and Surgical History reviewed in Epic today.    MEDICATIONS:    Current Outpatient Medications   Medication Sig Dispense Refill     memantine (NAMENDA) 5 MG tablet Take 5 mg by mouth daily       ACETAMINOPHEN EXTRA STRENGTH 500 MG tablet TAKE TWO TABLETS (1000MG) BY MOUTH EVERY 8 HOURS AS NEEDED 60 tablet 11     Cholecalciferol (VITAMIN D3) 50 MCG (2000 UT) CAPS Take 1 capsule by mouth every morning       Cholecalciferol (VITAMIN D3) 50 MCG (2000 UT) TABS TAKE 1 TABLET BY MOUTH EVERY MORNING 28 tablet PRN     cyanocobalamin (CYANOCOBALAMIN) 1000 MCG/ML injection INJECT 1 ML (1000 MCG) AS DIRECTED ONCE A MONTH 1 mL 97     donepezil (ARICEPT) 10 MG tablet TAKE 1 TABLET BY MOUTH EVERY MORNING 28 tablet PRN     EAR DROPS 6.5 % otic solution GIVE 4 DROPS IN BOTH  "EARS FOR 5 DAYS EVERY MONTH;IRRIGATE AS NEEDED AFTER DROPS 15 mL 97     omeprazole (PRILOSEC) 20 MG DR capsule TAKE 1 CAPSULE BY MOUTH EVERY MORNING 28 capsule PRN     [facility] SAFETY SYRINGE 25G X 1\" 3 ML MISC USE AS DIRECTED TO INJECT CYANOCOBALAMIN ONCE A MONTH 1 each 11         REVIEW OF SYSTEMS:  Unobtainable secondary to cognitive impairment. Reports feeling good today    Objective:  /77   Pulse 67   Resp 18   SpO2 93%   Exam:  GENERAL APPEARANCE:  Alert, in no distress, appears healthy  ENT:  Mouth and posterior oropharynx normal, moist mucous membranes, [place]  EYES:  EOM, conjunctivae, lids, pupils and irises normal, PERRL  RESP:  respiratory effort and palpation of chest normal, lungs clear to auscultation , no respiratory distress  CV:  Palpation and auscultation of heart done , regular rate and rhythm, no murmur, rub, or gallop, peripheral edema trace+ in LEs  ABDOMEN:  normal bowel sounds, soft, nontender, no hepatosplenomegaly or other masses, no guarding or rebound  M/S:   Gait and station normal  muscle strength 5/5 all 4 ext.  NEURO:   Cranial nerves 2-12 are normal tested and grossly at patient's baseline, speech clear  PSYCH:  insight and judgement impaired, memory impaired , affect and mood normal    Labs:     Most Recent 3 BMP's:  Recent Labs   Lab Test 09/25/18  0944 08/29/17  1521 09/06/16  1551    144 144   POTASSIUM 4.6 4.4 5.0   CHLORIDE 104 108 108   CO2 28 32 30   BUN 11 18 13   CR 1.13 1.14 1.08   ANIONGAP 9 4 6   MARIBEL 9.2 8.6 9.0   GLC 94 93 89       ASSESSMENT/PLAN:  Lethargy  Improved. Out for breakfast, increased activity participation  1. Cont. To invite to activities  2. Monitor am po intake  3. Reassess over next week with restarting of namenda at lower dose of 5 mg qam  4. Bmp in next 2-4 mos, hgb in next 2-4 mos    Late onset Alzheimer's disease with behavioral disturbance (H)  Memory loss, increased act. Participation. Increased freq. Of sexually inappropriate " behaviors  1. Cont. aricept  2. Spoke with dtr, plan to restart nemanda 5 mg qam  3. Reassess over next week. For no improvement of sexually inappropriate behaviors, may increase namenda to 5 mg bid  4. Cont. Secure memory care unit    Tremor  No recent increased s/s. Tremor not present during exam  1. Monitor for worsening tremor, affecting ADLs  2. Monitor for changes in gait, slowed gait  3. Reassess over next few weeks      Electronically signed by:  LEIA Whyte CNP                 Sincerely,        LEIA Whyte CNP

## 2021-05-20 ENCOUNTER — ASSISTED LIVING VISIT (OUTPATIENT)
Dept: GERIATRICS | Facility: CLINIC | Age: 86
End: 2021-05-20
Payer: MEDICARE

## 2021-05-20 VITALS
SYSTOLIC BLOOD PRESSURE: 127 MMHG | DIASTOLIC BLOOD PRESSURE: 66 MMHG | RESPIRATION RATE: 16 BRPM | HEART RATE: 68 BPM | OXYGEN SATURATION: 93 %

## 2021-05-20 DIAGNOSIS — R22.9 SKIN MASS: Primary | ICD-10-CM

## 2021-05-20 DIAGNOSIS — H61.23 BILATERAL IMPACTED CERUMEN: ICD-10-CM

## 2021-05-20 DIAGNOSIS — G30.1 LATE ONSET ALZHEIMER'S DISEASE WITH BEHAVIORAL DISTURBANCE (H): ICD-10-CM

## 2021-05-20 DIAGNOSIS — F02.818 LATE ONSET ALZHEIMER'S DISEASE WITH BEHAVIORAL DISTURBANCE (H): ICD-10-CM

## 2021-05-20 NOTE — PROGRESS NOTES
[place] [facility]  [place] Medical Record Number:  [id number]  Place of Service where encounter took place:  [facility]  [facility] ([facility]) [[id number]]  Chief Complaint   Patient presents with     Derm Problem       HPI:    Aleksandar Decker  is a 89 year old (8/28/1926), who is being seen today for an episodic care visit.  HPI information obtained from: facility chart records, facility staff, patient report and [facility] chart review. Today's concern is:  Skin mass, alzheimer's, cerumen. Has h/o melanoma. Followed by Derm.  Noted to have dark colored skin mass L upper cheek.  2 additional dark skin masses R face.  L cheek mass 1.75cm diameter, sl raised, not uniform in color. Has memory loss due to alzheimer's.  Had namenda dc'd.  Cont. On aricept. Has had increased alertness, however some increase in sexually inappropriate comments. namenda restarted at lower dose of 5 mg every day.  No increase in daytime sleepiness.   Has had agitation at times toward staff with redirection. Upon check of ear canals, has some cerumen bilat ear canals.     Past Medical and Surgical History reviewed in Epic today.    MEDICATIONS:    Current Outpatient Medications   Medication Sig Dispense Refill     ACETAMINOPHEN EXTRA STRENGTH 500 MG tablet TAKE TWO TABLETS (1000MG) BY MOUTH EVERY 8 HOURS AS NEEDED 60 tablet 11     Cholecalciferol (VITAMIN D3) 50 MCG (2000 UT) CAPS Take 1 capsule by mouth every morning       Cholecalciferol (VITAMIN D3) 50 MCG (2000 UT) TABS TAKE 1 TABLET BY MOUTH EVERY MORNING 28 tablet PRN     cyanocobalamin (CYANOCOBALAMIN) 1000 MCG/ML injection INJECT 1 ML (1000 MCG) AS DIRECTED ONCE A MONTH 1 mL 97     donepezil (ARICEPT) 10 MG tablet TAKE 1 TABLET BY MOUTH EVERY MORNING 28 tablet PRN     EAR DROPS 6.5 % otic solution GIVE 4 DROPS IN BOTH EARS FOR 5 DAYS EVERY MONTH;IRRIGATE AS NEEDED AFTER DROPS 15 mL 97     memantine (NAMENDA) 5 MG tablet Take 5 mg by mouth daily       omeprazole  "(PRILOSEC) 20 MG DR capsule TAKE 1 CAPSULE BY MOUTH EVERY MORNING 28 capsule PRN     [facility] SYRINGE 25G X 1\" 3 ML MISC USE AS DIRECTED TO INJECT CYANOCOBALAMIN ONCE A MONTH 1 each 11         REVIEW OF SYSTEMS:  Unobtainable secondary to cognitive impairment. Reports feeling fine today.  Denies any memory of agitation towards staff.    Objective:  /66   Pulse 68   Resp 16   SpO2 93%   Exam:  GENERAL APPEARANCE:  Alert, in no distress, appears healthy, cooperative  ENT:  Mouth and posterior oropharynx normal, moist mucous membranes, [place], cerumen bilat ear canals  EYES:  EOM, conjunctivae, lids, pupils and irises normal, PERRL  RESP:  respiratory effort and palpation of chest normal, lungs clear to auscultation , no respiratory distress  CV:  Palpation and auscultation of heart done , regular rate and rhythm, no murmur, rub, or gallop, no edema  ABDOMEN:  normal bowel sounds, soft, nontender, no hepatosplenomegaly or other masses, no guarding or rebound  M/S:   Gait and station normal  muscle strength 5/5 all 4 ext.  NEURO:   Cranial nerves 2-12 are normal tested and grossly at patient's baseline, speech clear  PSYCH:  insight and judgement impaired, memory impaired , affect and mood normal    Labs:     Most Recent 3 CBC's:  Recent Labs   Lab Test 09/25/18  0944 08/29/17  1521 09/06/16  1551   WBC 4.6 5.4 5.8   HGB 17.4 16.0 17.3   MCV 97 96 96    173 165     Most Recent 3 BMP's:  Recent Labs   Lab Test 09/25/18  0944 08/29/17  1521 09/06/16  1551    144 144   POTASSIUM 4.6 4.4 5.0   CHLORIDE 104 108 108   CO2 28 32 30   BUN 11 18 13   CR 1.13 1.14 1.08   ANIONGAP 9 4 6   MARIBEL 9.2 8.6 9.0   GLC 94 93 89       ASSESSMENT/PLAN:  Skin mass  L upper cheek, non-uniform in color, dark, irreg. Shape.  H/o melanoma face  1. Left vm for dtr t omake f/u Derm appt  2. Monitor skin mass, L face, x2 R face for changes  3. 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 Increased alertness with decreased namenda dose, some recent increased agitation towards staff when redirected.    1. Cont. aricept  2. Cont. namenda  3. Left vm for dtr, will consider increasing namenda to 5 mg bid  4. If above ineffective, consider celexa  5. Reassess over next few days    Bilateral impacted cerumen  Recurrent  1. Start debrox gtts  2. Monitor for decrease in hearing acuity  3. Reassess for cerumen over next week. Irrigate ears prn      Electronically signed by:  LEIA Whyte CNP

## 2021-05-20 NOTE — LETTER
5/20/2021        RE: Aleksandar Decker  C/o Arelis Love  [address] [address]        [place] [facility]  [place] [facility] Record Number:  [id number]  Place of Service where encounter took place:  [facility] [employer] [facility]  [facility] ([facility]) [[id number]]  Chief Complaint   Patient presents with     Derm Problem       HPI:    Aleksandar Decker  is a 89 year old (8/28/1926), who is being seen today for an episodic care visit.  HPI information obtained from: facility chart records, facility staff, patient report and [facility] chart review. Today's concern is:  Skin mass, alzheimer's, cerumen. Has h/o melanoma. Followed by Derm.  Noted to have dark colored skin mass L upper cheek.  2 additional dark skin masses R face.  L cheek mass 1.75cm diameter, sl raised, not uniform in color. Has memory loss due to alzheimer's.  Had namenda dc'd.  Cont. On aricept. Has had increased alertness, however some increase in sexually inappropriate comments. namenda restarted at lower dose of 5 mg every day.  No increase in daytime sleepiness.   Has had agitation at times toward staff with redirection. Upon check of ear canals, has some cerumen bilat ear canals.     Past Medical and Surgical History reviewed in Epic today.    MEDICATIONS:    Current Outpatient Medications   Medication Sig Dispense Refill     ACETAMINOPHEN EXTRA STRENGTH 500 MG tablet TAKE TWO TABLETS (1000MG) BY MOUTH EVERY 8 HOURS AS NEEDED 60 tablet 11     Cholecalciferol (VITAMIN D3) 50 MCG (2000 UT) CAPS Take 1 capsule by mouth every morning       Cholecalciferol (VITAMIN D3) 50 MCG (2000 UT) TABS TAKE 1 TABLET BY MOUTH EVERY MORNING 28 tablet PRN     cyanocobalamin (CYANOCOBALAMIN) 1000 MCG/ML injection INJECT 1 ML (1000 MCG) AS DIRECTED ONCE A MONTH 1 mL 97     donepezil (ARICEPT) 10 MG tablet TAKE 1 TABLET BY MOUTH EVERY MORNING 28 tablet PRN     EAR DROPS 6.5 % otic solution GIVE 4 DROPS IN BOTH EARS FOR 5 DAYS EVERY MONTH;IRRIGATE AS  "NEEDED AFTER DROPS 15 mL 97     memantine (NAMENDA) 5 MG tablet Take 5 mg by mouth daily       omeprazole (PRILOSEC) 20 MG DR capsule TAKE 1 CAPSULE BY MOUTH EVERY MORNING 28 capsule PRN     [facility] SAFETY SYRINGE 25G X 1\" 3 ML MISC USE AS DIRECTED TO INJECT CYANOCOBALAMIN ONCE A MONTH 1 each 11         REVIEW OF SYSTEMS:  Unobtainable secondary to cognitive impairment. Reports feeling fine today.  Denies any memory of agitation towards staff.    Objective:  /66   Pulse 68   Resp 16   SpO2 93%   Exam:  GENERAL APPEARANCE:  Alert, in no distress, appears healthy, cooperative  ENT:  Mouth and posterior oropharynx normal, moist mucous membranes, [place], cerumen bilat ear canals  EYES:  EOM, conjunctivae, lids, pupils and irises normal, PERRL  RESP:  respiratory effort and palpation of chest normal, lungs clear to auscultation , no respiratory distress  CV:  Palpation and auscultation of heart done , regular rate and rhythm, no murmur, rub, or gallop, no edema  ABDOMEN:  normal bowel sounds, soft, nontender, no hepatosplenomegaly or other masses, no guarding or rebound  M/S:   Gait and station normal  muscle strength 5/5 all 4 ext.  NEURO:   Cranial nerves 2-12 are normal tested and grossly at patient's baseline, speech clear  PSYCH:  insight and judgement impaired, memory impaired , affect and mood normal    Labs:     Most Recent 3 CBC's:  Recent Labs   Lab Test 09/25/18  0944 08/29/17  1521 09/06/16  1551   WBC 4.6 5.4 5.8   HGB 17.4 16.0 17.3   MCV 97 96 96    173 165     Most Recent 3 BMP's:  Recent Labs   Lab Test 09/25/18  0944 08/29/17  1521 09/06/16  1551    144 144   POTASSIUM 4.6 4.4 5.0   CHLORIDE 104 108 108   CO2 28 32 30   BUN 11 18 13   CR 1.13 1.14 1.08   ANIONGAP 9 4 6   MARIBEL 9.2 8.6 9.0   GLC 94 93 89       ASSESSMENT/PLAN:  Skin mass  L upper cheek, non-uniform in color, dark, irreg. Shape.  H/o melanoma face  1. Left vm for dtr t omake f/u Derm appt  2. Monitor skin mass, L " face, x2 R face for changes  3. Monitor skin for open areas    Late onset Alzheimer's disease with behavioral disturbance (H)  Memory loss. Increased alertness with decreased namenda dose, some recent increased agitation towards staff when redirected.    1. Cont. aricept  2. Cont. namenda  3. Left vm for dtr, will consider increasing namenda to 5 mg bid  4. If above ineffective, consider celexa  5. Reassess over next few days    Bilateral impacted cerumen  Recurrent  1. Start debrox gtts  2. Monitor for decrease in hearing acuity  3. Reassess for cerumen over next week. Irrigate ears prn      Electronically signed by:  LEIA Whyte CNP                 Sincerely,        LEIA Whyte CNP

## 2021-05-24 ENCOUNTER — ASSISTED LIVING VISIT (OUTPATIENT)
Dept: GERIATRICS | Facility: CLINIC | Age: 86
End: 2021-05-24
Payer: MEDICARE

## 2021-05-24 VITALS — OXYGEN SATURATION: 97 % | BODY MASS INDEX: 25.22 KG/M2 | TEMPERATURE: 97.1 F | WEIGHT: 161 LBS

## 2021-05-24 DIAGNOSIS — R22.9 SKIN MASS: Primary | ICD-10-CM

## 2021-05-24 DIAGNOSIS — G30.1 LATE ONSET ALZHEIMER'S DISEASE WITH BEHAVIORAL DISTURBANCE (H): ICD-10-CM

## 2021-05-24 DIAGNOSIS — K21.9 GASTROESOPHAGEAL REFLUX DISEASE WITHOUT ESOPHAGITIS: ICD-10-CM

## 2021-05-24 DIAGNOSIS — F02.818 LATE ONSET ALZHEIMER'S DISEASE WITH BEHAVIORAL DISTURBANCE (H): ICD-10-CM

## 2021-05-24 DIAGNOSIS — E53.8 VITAMIN B 12 DEFICIENCY: ICD-10-CM

## 2021-05-24 NOTE — LETTER
5/24/2021        RE: Aleksandar Decker  C/o Arelis Love  [address] [place] [id number]        Aleksandar Decker is a 89 year old male seen May 24, 2021 at [facility] Memory Care unit where he has resided for one and a half years (admit 11/2019)   Pt is seen in his room, up ambulatory without device.   His daughter Arelis is present and she helps with history.   Pt is feeling well, has no complaints.   Medications have been decreased secondary to frequent lethargy, and he is more active.  However AL nursing staff reports patient has developed a relationship with another resident in the Memory Care unit.   There has been some inappropriate activity in the Common Room and patient's room, with pt becoming agitated when staff has attempted redirection.   Last week the memantine was increased back to bid.   Arelis is concerned about recurrence of sleepiness, but that hasn't happened to date     By chart review, patient has had memory loss since 2009.   I saw him in my [facility] [id number]   Started on donepezil and memantine ten years ago.  He has had a remarkably gradual progression of his dementia    Past Medical History:   Diagnosis Date     Dementia of the Alzheimer's type (H)     dr Jacobo and neurologist: Kulwant     Esophageal reflux      Fever and other physiologic disturbances of temperature regulation     2005 ? tick related illness w fever;; resolved       GERD (gastroesophageal reflux disease)     better with ranitidine     Hearing loss     uses aides      Hyperlipidemia LDL goal <160      Hypothyroidism      Lumbago     chronic , non radicular     Vitamin B 12 deficiency      Past Surgical History:   Procedure Laterality Date     HC COLONOSCOPY THRU STOMA, DIAGNOSTIC      had colonoscopy in [place] 2007 (normal)      Z NONSPECIFIC PROCEDURE      appy      SH:  .   [facility] lived at [facility].  He has 3 daughters: Carla, Kyra and Arelis.    He can't remember where they  live.   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   Non smoker    ROS:  Ambulatory without device  MoCA 18/30  SLUMS 5/30 1/2019  Wt Readings from Last 5 Encounters:   05/24/21 73 kg (161 lb)   03/16/21 75.3 kg (166 lb)   03/08/21 75.3 kg (166 lb)   01/13/20 74.8 kg (165 lb)   11/25/19 74.8 kg (165 lb)      EXAM:   NAD  Temp 97.1  F (36.2  C)   Wt 73 kg (161 lb)   SpO2 97%   BMI 25.22 kg/m     Neck supple without adenopathy  Lungs with decreased BS, no rales or wheeze  Heart RRR s1s2, 3/6 JOSH @LSB andacross precordium  Abd soft, NT, no distention, +BS  Ext without edema  Neuro: preserved language and mobility, repetitive, some disorientation.   +right hand resting tremor, no stiffness or rigidity  Psych: affect okay, cheery.     Labs reviewed  1/2021: CBC, BMP normal      IMP/PLAN:   (K21.9) Gastroesophageal reflux disease, esophagitis presence not specified   Comment: no current sx  Plan: continue omeprazole 20 mg/day and follow     (R22.9) Skin mass    Comment: left temple   Plan: Dermatology appointment 7/20/21     (G30.1,  F02.81) Late onset Alzheimer's disease with behavioral disturbance (H)  Comment: very gradual decline over 10 years.     Low functional status and STML with preserved language and mobility   Plan: AL Memory Care unit for support with meds, meals, activity, secure unit    Continue PTA donepezil 10 mg/day   Memantine 5 mg bid.   If he becomes too somnolent again, may decrease and try an selective serotonin reuptake inhibitor in it's place, or add citalopram if memantine not effective in decreasing agitative behaviors.        (M51.37) CHRONIC LOW BACK PAIN  Comment: intermittent   Plan: prn acetaminophen, local measures    (E53.8) Vitamin B 12 deficiency  Plan: continue monthly B12 1000 units SQ    Violeta Jacobo MD         Sincerely,        Violeta Jacobo MD

## 2021-05-28 ENCOUNTER — RECORDS - HEALTHEAST (OUTPATIENT)
Dept: LAB | Facility: CLINIC | Age: 86
End: 2021-05-28

## 2021-05-31 NOTE — PROGRESS NOTES
Aleksandar Decker is a 89 year old male seen May 24, 2021 at [facility] Memory Care unit where he has resided for one and a half years (admit 11/2019)   Pt is seen in his room, up ambulatory without device.   His daughter Arelis is present and she helps with history.   Pt is feeling well, has no complaints.   Medications have been decreased secondary to frequent lethargy, and he is more active.  However AL nursing staff reports patient has developed a relationship with another resident in the Memory Care unit.   There has been some inappropriate activity in the Common Room and patient's room, with pt becoming agitated when staff has attempted redirection.   Last week the memantine was increased back to bid.   Arelis is concerned about recurrence of sleepiness, but that hasn't happened to date     By chart review, patient has had memory loss since 2009.   I saw him in my [facility] [id number]   Started on donepezil and memantine ten years ago.  He has had a remarkably gradual progression of his dementia    Past Medical History:   Diagnosis Date     Dementia of the Alzheimer's type (H)     dr Jacobo and neurologist: Kulwant     Esophageal reflux      Fever and other physiologic disturbances of temperature regulation     2005 ? tick related illness w fever;; resolved       GERD (gastroesophageal reflux disease)     better with ranitidine     Hearing loss     uses aides      Hyperlipidemia LDL goal <160      Hypothyroidism      Lumbago     chronic , non radicular     Vitamin B 12 deficiency      Past Surgical History:   Procedure Laterality Date      COLONOSCOPY THRU STOMA, DIAGNOSTIC      had colonoscopy in [place] 2007 (normal)      [facility] NONSPECIFIC PROCEDURE      appy      SH:  .   Previously [facility] at [facility].  He has 3 daughters: Carla, Kyra and Arelis.    He can't remember where they live.   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   Non smoker    ROS:   Ambulatory without device  MoCA 18/30  SLUMS 5/30 1/2019  Wt Readings from Last 5 Encounters:   05/24/21 73 kg (161 lb)   03/16/21 75.3 kg (166 lb)   03/08/21 75.3 kg (166 lb)   01/13/20 74.8 kg (165 lb)   11/25/19 74.8 kg (165 lb)      EXAM:   NAD  Temp 97.1  F (36.2  C)   Wt 73 kg (161 lb)   SpO2 97%   BMI 25.22 kg/m     Neck supple without adenopathy  Lungs with decreased BS, no rales or wheeze  Heart RRR s1s2, 3/6 JOSH @LSB andacross precordium  Abd soft, NT, no distention, +BS  Ext without edema  Neuro: preserved language and mobility, repetitive, some disorientation.   +right hand resting tremor, no stiffness or rigidity  Psych: affect okay, cheery.     Labs reviewed  1/2021: CBC, BMP normal      IMP/PLAN:   (K21.9) Gastroesophageal reflux disease, esophagitis presence not specified   Comment: no current sx  Plan: continue omeprazole 20 mg/day and follow     (R22.9) Skin mass    Comment: left temple   Plan: Dermatology appointment 7/20/21     (G30.1,  F02.81) Late onset Alzheimer's disease with behavioral disturbance (H)  Comment: very gradual decline over 10 years.     Low functional status and STML with preserved language and mobility   Plan: AL Memory Care unit for support with meds, meals, activity, secure unit    Continue PTA donepezil 10 mg/day   Memantine 5 mg bid.   If he becomes too somnolent again, may decrease and try an selective serotonin reuptake inhibitor in it's place, or add citalopram if memantine not effective in decreasing agitative behaviors.        (M51.37) CHRONIC LOW BACK PAIN  Comment: intermittent   Plan: prn acetaminophen, local measures    (E53.8) Vitamin B 12 deficiency  Plan: continue monthly B12 1000 units SQ    Violeta Jacobo MD

## 2021-06-07 ENCOUNTER — ASSISTED LIVING VISIT (OUTPATIENT)
Dept: GERIATRICS | Facility: CLINIC | Age: 86
End: 2021-06-07
Payer: MEDICARE

## 2021-06-07 VITALS — SYSTOLIC BLOOD PRESSURE: 133 MMHG | RESPIRATION RATE: 16 BRPM | DIASTOLIC BLOOD PRESSURE: 65 MMHG | HEART RATE: 67 BPM

## 2021-06-07 DIAGNOSIS — R53.83 LETHARGY: Primary | ICD-10-CM

## 2021-06-07 DIAGNOSIS — G30.1 LATE ONSET ALZHEIMER'S DISEASE WITH BEHAVIORAL DISTURBANCE (H): ICD-10-CM

## 2021-06-07 DIAGNOSIS — F02.818 LATE ONSET ALZHEIMER'S DISEASE WITH BEHAVIORAL DISTURBANCE (H): ICD-10-CM

## 2021-06-07 RX ORDER — MEMANTINE HYDROCHLORIDE 5 MG/1
5 TABLET ORAL 2 TIMES DAILY
COMMUNITY
End: 2021-06-14

## 2021-06-07 NOTE — PROGRESS NOTES
[place] [facility]  [place] [facility] Record Number:  [id number]  Place of Service where encounter took place:  [facility]  [facility] ([facility]) [[id number]]  Chief Complaint   Patient presents with     Fatigue       HPI:    Aleksandar Decker  is a 89 year old (8/28/1926), who is being seen today for an episodic care visit.  HPI information obtained from: facility chart records, facility staff, patient report and [facility] chart review. Today's concern is: lethargy, alzheimer's.  Has memory loss due to alzheimer's. Has had increased am fatigue, sleeping in in ams, missing breakfast. Cont on aricept.  namenda dose decrease from 10 mg every day to 5 mg every day.  Had increase in sexually inappropriate verbal behaviors, less responsive to staff redirection at times.  namenda dose increased to 5 mg bid last last month.  Per staff, has had less anxiety, mod mores stable.  No reports of agitation.  No reports of increased daytime sleepiness.       Past Medical and Surgical History reviewed in Epic today.    MEDICATIONS:    Current Outpatient Medications   Medication Sig Dispense Refill     memantine (NAMENDA) 5 MG tablet Take 5 mg by mouth 2 times daily       ACETAMINOPHEN EXTRA STRENGTH 500 MG tablet TAKE TWO TABLETS (1000MG) BY MOUTH EVERY 8 HOURS AS NEEDED 60 tablet 11     Cholecalciferol (VITAMIN D3) 50 MCG (2000 UT) CAPS Take 1 capsule by mouth every morning       Cholecalciferol (VITAMIN D3) 50 MCG (2000 UT) TABS TAKE 1 TABLET BY MOUTH EVERY MORNING 28 tablet PRN     cyanocobalamin (CYANOCOBALAMIN) 1000 MCG/ML injection INJECT 1 ML (1000 MCG) AS DIRECTED ONCE A MONTH 1 mL 97     donepezil (ARICEPT) 10 MG tablet TAKE 1 TABLET BY MOUTH EVERY MORNING 28 tablet PRN     EAR DROPS 6.5 % otic solution GIVE 4 DROPS IN BOTH EARS FOR 5 DAYS EVERY MONTH;IRRIGATE AS NEEDED AFTER DROPS 15 mL 97     omeprazole (PRILOSEC) 20 MG DR capsule TAKE 1 CAPSULE BY MOUTH EVERY MORNING 28 capsule PRN     VANISHPOINT SAFETY  "SYRINGE 25G X 1\" 3 ML MISC USE AS DIRECTED TO INJECT CYANOCOBALAMIN ONCE A MONTH 1 each 11         REVIEW OF SYSTEMS:  Unobtainable secondary to cognitive impairment. Reports feeling ok    Objective:  /65   Pulse 67   Resp 16   Exam:  GENERAL APPEARANCE:  Alert, in no distress, appears healthy  ENT:  Mouth and posterior oropharynx normal, moist mucous membranes, [place]  EYES:  EOM normal, Conjunctiva and lids normal  RESP:  respiratory effort and palpation of chest normal, lungs clear to auscultation , no respiratory distress  CV:  Palpation and auscultation of heart done , regular rate and rhythm, no murmur, rub, or gallop, no edema  ABDOMEN:  normal bowel sounds, soft, nontender, no hepatosplenomegaly or other masses, no guarding or rebound  M/S:   Gait and station normal  muscle strength 5/5 all 4 ext., normal tone  NEURO:   Cranial nerves 2-12 are normal tested and grossly at patient's baseline, speech clear  PSYCH:  insight and judgement impaired, memory impaired , affect and mood normal    Labs:     Most Recent 3 CBC's:  Recent Labs   Lab Test 09/25/18  0944 08/29/17  1521 09/06/16  1551   WBC 4.6 5.4 5.8   HGB 17.4 16.0 17.3   MCV 97 96 96    173 165     Most Recent 3 BMP's:  Recent Labs   Lab Test 09/25/18  0944 08/29/17  1521 09/06/16  1551    144 144   POTASSIUM 4.6 4.4 5.0   CHLORIDE 104 108 108   CO2 28 32 30   BUN 11 18 13   CR 1.13 1.14 1.08   ANIONGAP 9 4 6   MARIBEL 9.2 8.6 9.0   GLC 94 93 89       ASSESSMENT/PLAN:  Lethargy  Currently stable.  No reports of increased s/s with increased namenda dose  1. Cont. Current namenda dose  2. moninor for reports of sleeping in ams, refusing to get dressed  3. Monitor for reports of insomnia  4. 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 Decrease in agitation with increased namenda dose  1. Cont. aricept  2. Cont. namenda as above  3. Monitor for sexually inappropriate " verbal behaviors, agitation with staff redirection  4. For above behaviors may consider celexa  5. Reassess over next couple weeks      Electronically signed by:  LEIA Whyte CNP

## 2021-06-07 NOTE — LETTER
6/7/2021        RE: Aleksandar Decker  C/o Arelis Love  [address] [address]        [place] [facility]  [place] Medical Record Number:  [id number]  Place of Service where encounter took place:  [facility] [employer]  [facility] ([facility]) [[id number]]  Chief Complaint   Patient presents with     Fatigue       HPI:    Aleksandar Decker  is a 89 year old (8/28/1926), who is being seen today for an episodic care visit.  HPI information obtained from: facility chart records, facility staff, patient report and [facility] chart review. Today's concern is: lethargy, alzheimer's.  Has memory loss due to alzheimer's. Has had increased am fatigue, sleeping in in ams, missing breakfast. Cont on aricept.  namenda dose decrease from 10 mg every day to 5 mg every day.  Had increase in sexually inappropriate verbal behaviors, less responsive to staff redirection at times.  namenda dose increased to 5 mg bid last last month.  Per staff, has had less anxiety, mod mores stable.  No reports of agitation.  No reports of increased daytime sleepiness.       Past Medical and Surgical History reviewed in Epic today.    MEDICATIONS:    Current Outpatient Medications   Medication Sig Dispense Refill     memantine (NAMENDA) 5 MG tablet Take 5 mg by mouth 2 times daily       ACETAMINOPHEN EXTRA STRENGTH 500 MG tablet TAKE TWO TABLETS (1000MG) BY MOUTH EVERY 8 HOURS AS NEEDED 60 tablet 11     Cholecalciferol (VITAMIN D3) 50 MCG (2000 UT) CAPS Take 1 capsule by mouth every morning       Cholecalciferol (VITAMIN D3) 50 MCG (2000 UT) TABS TAKE 1 TABLET BY MOUTH EVERY MORNING 28 tablet PRN     cyanocobalamin (CYANOCOBALAMIN) 1000 MCG/ML injection INJECT 1 ML (1000 MCG) AS DIRECTED ONCE A MONTH 1 mL 97     donepezil (ARICEPT) 10 MG tablet TAKE 1 TABLET BY MOUTH EVERY MORNING 28 tablet PRN     EAR DROPS 6.5 % otic solution GIVE 4 DROPS IN BOTH EARS FOR 5 DAYS EVERY MONTH;IRRIGATE AS NEEDED AFTER DROPS 15 mL 97     omeprazole  "(PRILOSEC) 20 MG DR capsule TAKE 1 CAPSULE BY MOUTH EVERY MORNING 28 capsule PRN     VANISHPOINT SAFETY SYRINGE 25G X 1\" 3 ML MISC USE AS DIRECTED TO INJECT CYANOCOBALAMIN ONCE A MONTH 1 each 11         REVIEW OF SYSTEMS:  Unobtainable secondary to cognitive impairment. Reports feeling ok    Objective:  /65   Pulse 67   Resp 16   Exam:  GENERAL APPEARANCE:  Alert, in no distress, appears healthy  ENT:  Mouth and posterior oropharynx normal, moist mucous membranes, [place]  EYES:  EOM normal, Conjunctiva and lids normal  RESP:  respiratory effort and palpation of chest normal, lungs clear to auscultation , no respiratory distress  CV:  Palpation and auscultation of heart done , regular rate and rhythm, no murmur, rub, or gallop, no edema  ABDOMEN:  normal bowel sounds, soft, nontender, no hepatosplenomegaly or other masses, no guarding or rebound  M/S:   Gait and station normal  muscle strength 5/5 all 4 ext., normal tone  NEURO:   Cranial nerves 2-12 are normal tested and grossly at patient's baseline, speech clear  PSYCH:  insight and judgement impaired, memory impaired , affect and mood normal    Labs:     Most Recent 3 CBC's:  Recent Labs   Lab Test 09/25/18  0944 08/29/17  1521 09/06/16  1551   WBC 4.6 5.4 5.8   HGB 17.4 16.0 17.3   MCV 97 96 96    173 165     Most Recent 3 BMP's:  Recent Labs   Lab Test 09/25/18  0944 08/29/17  1521 09/06/16  1551    144 144   POTASSIUM 4.6 4.4 5.0   CHLORIDE 104 108 108   CO2 28 32 30   BUN 11 18 13   CR 1.13 1.14 1.08   ANIONGAP 9 4 6   MARIBEL 9.2 8.6 9.0   GLC 94 93 89       ASSESSMENT/PLAN:  Lethargy  Currently stable.  No reports of increased s/s with increased namenda dose  1. Cont. Current namenda dose  2. moninor for reports of sleeping in ams, refusing to get dressed  3. Monitor for reports of insomnia  4. 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 Decrease in agitation with " increased namenda dose  1. Cont. aricept  2. Cont. namenda as above  3. Monitor for sexually inappropriate verbal behaviors, agitation with staff redirection  4. For above behaviors may consider celexa  5. Reassess over next couple weeks      Electronically signed by:  LEIA Whyte CNP                 Sincerely,        LEIA Whyte CNP

## 2021-06-12 DIAGNOSIS — F03.90 DEMENTIA WITHOUT BEHAVIORAL DISTURBANCE, UNSPECIFIED DEMENTIA TYPE: Primary | ICD-10-CM

## 2021-06-14 RX ORDER — MEMANTINE HYDROCHLORIDE 5 MG/1
TABLET ORAL
Qty: 56 TABLET | Status: SHIPPED | OUTPATIENT
Start: 2021-06-14

## 2021-07-27 ENCOUNTER — ASSISTED LIVING VISIT (OUTPATIENT)
Dept: GERIATRICS | Facility: CLINIC | Age: 86
End: 2021-07-27
Payer: MEDICARE

## 2021-07-27 VITALS
OXYGEN SATURATION: 94 % | RESPIRATION RATE: 16 BRPM | SYSTOLIC BLOOD PRESSURE: 133 MMHG | HEART RATE: 68 BPM | DIASTOLIC BLOOD PRESSURE: 67 MMHG

## 2021-07-27 DIAGNOSIS — G30.1 LATE ONSET ALZHEIMER'S DISEASE WITH BEHAVIORAL DISTURBANCE (H): ICD-10-CM

## 2021-07-27 DIAGNOSIS — H61.23 BILATERAL IMPACTED CERUMEN: Primary | ICD-10-CM

## 2021-07-27 DIAGNOSIS — F02.818 LATE ONSET ALZHEIMER'S DISEASE WITH BEHAVIORAL DISTURBANCE (H): ICD-10-CM

## 2021-07-27 DIAGNOSIS — K21.9 GASTROESOPHAGEAL REFLUX DISEASE WITHOUT ESOPHAGITIS: ICD-10-CM

## 2021-07-27 NOTE — PROGRESS NOTES
"[place] [facility]  [place] Medical Record Number:  [id number]  Place of Service where encounter took place:  [facility]  [facility] ([facility]) [[id number]]  Chief Complaint   Patient presents with     Cerumen (impacted)       HPI:    Aleksandar Decker  is a 89 year old (8/28/1926), who is being seen today for an episodic care visit.  HPI information obtained from: facility chart records, facility staff, patient report and [facility] chart review. Today's concern is: cerumen, GERD, Alzheimer's.  Has recurrent bilat cerumen.  Had debrox gtts.  Has sig. Hearing loss. Wears bilat hearing aids.  For GERD cont. On prilosec.  Per staff, has stable po intake.  No recent reports of increased s/s.  Has memory loss due to alzheimer's. Cont. On aricept, namenda. Failed GDR of namenda.  Increased to 5 mg bid.  Mood, behaviors gen. Stable.  Coming out for meals.       Past Medical and Surgical History reviewed in Epic today.    MEDICATIONS:    Current Outpatient Medications   Medication Sig Dispense Refill     ACETAMINOPHEN EXTRA STRENGTH 500 MG tablet TAKE TWO TABLETS (1000MG) BY MOUTH EVERY 8 HOURS AS NEEDED 60 tablet 11     Cholecalciferol (VITAMIN D3) 50 MCG (2000 UT) CAPS Take 1 capsule by mouth every morning       Cholecalciferol (VITAMIN D3) 50 MCG (2000 UT) TABS TAKE 1 TABLET BY MOUTH EVERY MORNING 28 tablet PRN     cyanocobalamin (CYANOCOBALAMIN) 1000 MCG/ML injection INJECT 1 ML (1000 MCG) AS DIRECTED ONCE A MONTH 1 mL 97     donepezil (ARICEPT) 10 MG tablet TAKE 1 TABLET BY MOUTH EVERY MORNING 28 tablet PRN     EAR DROPS 6.5 % otic solution GIVE 4 DROPS IN BOTH EARS FOR 5 DAYS EVERY MONTH;IRRIGATE AS NEEDED AFTER DROPS 15 mL 97     memantine (NAMENDA) 5 MG tablet TAKE 1 TABLET BY MOUTH TWICE DAILY 56 tablet PRN     omeprazole (PRILOSEC) 20 MG DR capsule TAKE 1 CAPSULE BY MOUTH EVERY MORNING 28 capsule PRN     VANISHPOINT SAFETY SYRINGE 25G X 1\" 3 ML MISC USE AS DIRECTED TO INJECT CYANOCOBALAMIN ONCE A " MONTH 1 each 11         REVIEW OF SYSTEMS:  Unobtainable secondary to cognitive impairment. Reports feeling ok today    Objective:  /67   Pulse 68   Resp 16   SpO2 94%   Exam:  GENERAL APPEARANCE:  Alert, in no distress, cooperative  ENT:  Mouth and posterior oropharynx normal, moist mucous membranes, [place], bilat ear canals free of cerumen s/p cerumen removal with curette  EYES:  EOM, conjunctivae, lids, pupils and irises normal, PERRL  RESP:  respiratory effort and palpation of chest normal, lungs clear to auscultation , no respiratory distress  CV:  Palpation and auscultation of heart done , regular rate and rhythm, no murmur, rub, or gallop, no edema  ABDOMEN:  normal bowel sounds, soft, nontender, no hepatosplenomegaly or other masses, no guarding or rebound  M/S:   Gait and station normal  muscle strength 5/5 all 4 ext.  SKIN:  skin mass excision x 3 r temple.  clean, no drainage  NEURO:   Cranial nerves 2-12 are normal tested and grossly at patient's baseline, speech clear  PSYCH:  insight and judgement impaired, memory impaired , affect and mood normal    Labs:     Most Recent 3 CBC's:Recent Labs   Lab Test 01/12/21  1023 10/14/20  0756 09/08/20  1006   WBC 4.9 4.9 3.7*   HGB 16.8 16.5 16.8   MCV 97 96 98    181 183     Most Recent 3 BMP's:Recent Labs   Lab Test 01/12/21  1023 10/14/20  0756 09/08/20  1006    143 141   POTASSIUM 4.4 4.4 4.7   CHLORIDE 105 105 104   CO2 28 30 30   BUN 14 13 10   CR 1.20 1.09 1.01   ANIONGAP 9 8 7   MARIBEL 8.9 8.5 8.8   GLC 69* 92 83       ASSESSMENT/PLAN:  Bilateral impacted cerumen  Recurrent  1. Cont. Prn debrox gtts  2. Plan to repeat debrox course, cerumen removal, irrigation over next 3 mos  3. Cont. bilat hearing aids    Gastroesophageal reflux disease without esophagitis  Currently stable  1. Cont. prilosec  2. Monitor for reports of nausea, abd. Discomfort  3. Follow po intake  4. hgb in next 1-3 mos    Late onset Alzheimer's disease with behavioral  disturbance (H)  Mood, behaviors gen. Stable. occ sleeps in ams  1. Cont. Aricept, namenda  2. Encourage to eat breakfast  3. Monitor for increased daytime sleepiness  4. Cont. To invite to activities throughout the day      Electronically signed by:  LEIA Whyte CNP

## 2021-07-27 NOTE — LETTER
7/27/2021        RE: Aleksandar Decker  C/o Arelis Love  [address] [address]        [place] [facility]  [place] Medical Record Number:  [id number]  Place of Service where encounter took place:  [facility] [employer]  [facility] ([facility]) [[id number]]  Chief Complaint   Patient presents with     Cerumen (impacted)       HPI:    Aleksandar Decker  is a 89 year old (8/28/1926), who is being seen today for an episodic care visit.  HPI information obtained from: facility chart records, facility staff, patient report and [facility] chart review. Today's concern is: cerumen, GERD, Alzheimer's.  Has recurrent bilat cerumen.  Had debrox gtts.  Has sig. Hearing loss. Wears bilat hearing aids.  For GERD cont. On prilosec.  Per staff, has stable po intake.  No recent reports of increased s/s.  Has memory loss due to alzheimer's. Cont. On aricept, namenda. Failed GDR of namenda.  Increased to 5 mg bid.  Mood, behaviors gen. Stable.  Coming out for meals.       Past Medical and Surgical History reviewed in Epic today.    MEDICATIONS:    Current Outpatient Medications   Medication Sig Dispense Refill     ACETAMINOPHEN EXTRA STRENGTH 500 MG tablet TAKE TWO TABLETS (1000MG) BY MOUTH EVERY 8 HOURS AS NEEDED 60 tablet 11     Cholecalciferol (VITAMIN D3) 50 MCG (2000 UT) CAPS Take 1 capsule by mouth every morning       Cholecalciferol (VITAMIN D3) 50 MCG (2000 UT) TABS TAKE 1 TABLET BY MOUTH EVERY MORNING 28 tablet PRN     cyanocobalamin (CYANOCOBALAMIN) 1000 MCG/ML injection INJECT 1 ML (1000 MCG) AS DIRECTED ONCE A MONTH 1 mL 97     donepezil (ARICEPT) 10 MG tablet TAKE 1 TABLET BY MOUTH EVERY MORNING 28 tablet PRN     EAR DROPS 6.5 % otic solution GIVE 4 DROPS IN BOTH EARS FOR 5 DAYS EVERY MONTH;IRRIGATE AS NEEDED AFTER DROPS 15 mL 97     memantine (NAMENDA) 5 MG tablet TAKE 1 TABLET BY MOUTH TWICE DAILY 56 tablet PRN     omeprazole (PRILOSEC) 20 MG DR capsule TAKE 1 CAPSULE BY MOUTH EVERY MORNING 28  "capsule PRN     [facility] SAFETY SYRINGE 25G X 1\" 3 ML MISC USE AS DIRECTED TO INJECT CYANOCOBALAMIN ONCE A MONTH 1 each 11         REVIEW OF SYSTEMS:  Unobtainable secondary to cognitive impairment. Reports feeling ok today    Objective:  /67   Pulse 68   Resp 16   SpO2 94%   Exam:  GENERAL APPEARANCE:  Alert, in no distress, cooperative  ENT:  Mouth and posterior oropharynx normal, moist mucous membranes, [place], bilat ear canals free of cerumen s/p cerumen removal with curette  EYES:  EOM, conjunctivae, lids, pupils and irises normal, PERRL  RESP:  respiratory effort and palpation of chest normal, lungs clear to auscultation , no respiratory distress  CV:  Palpation and auscultation of heart done , regular rate and rhythm, no murmur, rub, or gallop, no edema  ABDOMEN:  normal bowel sounds, soft, nontender, no hepatosplenomegaly or other masses, no guarding or rebound  M/S:   Gait and station normal  muscle strength 5/5 all 4 ext.  SKIN:  skin mass excision x 3 r temple.  clean, no drainage  NEURO:   Cranial nerves 2-12 are normal tested and grossly at patient's baseline, speech clear  PSYCH:  insight and judgement impaired, memory impaired , affect and mood normal    Labs:     Most Recent 3 CBC's:Recent Labs   Lab Test 01/12/21  1023 10/14/20  0756 09/08/20  1006   WBC 4.9 4.9 3.7*   HGB 16.8 16.5 16.8   MCV 97 96 98    181 183     Most Recent 3 BMP's:Recent Labs   Lab Test 01/12/21  1023 10/14/20  0756 09/08/20  1006    143 141   POTASSIUM 4.4 4.4 4.7   CHLORIDE 105 105 104   CO2 28 30 30   BUN 14 13 10   CR 1.20 1.09 1.01   ANIONGAP 9 8 7   MARIBEL 8.9 8.5 8.8   GLC 69* 92 83       ASSESSMENT/PLAN:  Bilateral impacted cerumen  Recurrent  1. Cont. Prn debrox gtts  2. Plan to repeat debrox course, cerumen removal, irrigation over next 3 mos  3. Cont. bilat hearing aids    Gastroesophageal reflux disease without esophagitis  Currently stable  1. Cont. prilosec  2. Monitor for reports of nausea, " abd. Discomfort  3. Follow po intake  4. 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 Stable. occ sleeps in ams  1. Cont. Aricept, namenda  2. Encourage to eat breakfast  3. Monitor for increased daytime sleepiness  4. Cont. To invite to activities throughout the day      Electronically signed by:  LEIA Whyte CNP                 Sincerely,        LEIA Whyte CNP

## 2021-09-27 ENCOUNTER — ASSISTED LIVING VISIT (OUTPATIENT)
Dept: GERIATRICS | Facility: CLINIC | Age: 86
End: 2021-09-27
Payer: MEDICARE

## 2021-09-27 ENCOUNTER — LAB REQUISITION (OUTPATIENT)
Dept: LAB | Facility: CLINIC | Age: 86
End: 2021-09-27
Payer: MEDICARE

## 2021-09-27 VITALS
RESPIRATION RATE: 18 BRPM | SYSTOLIC BLOOD PRESSURE: 120 MMHG | HEART RATE: 70 BPM | OXYGEN SATURATION: 95 % | DIASTOLIC BLOOD PRESSURE: 67 MMHG

## 2021-09-27 DIAGNOSIS — F02.80 LATE ONSET ALZHEIMER'S DISEASE WITHOUT BEHAVIORAL DISTURBANCE (H): ICD-10-CM

## 2021-09-27 DIAGNOSIS — R25.1 TREMOR: ICD-10-CM

## 2021-09-27 DIAGNOSIS — E03.9 HYPOTHYROIDISM, UNSPECIFIED: ICD-10-CM

## 2021-09-27 DIAGNOSIS — K21.9 GASTRO-ESOPHAGEAL REFLUX DISEASE WITHOUT ESOPHAGITIS: ICD-10-CM

## 2021-09-27 DIAGNOSIS — G30.1 LATE ONSET ALZHEIMER'S DISEASE WITHOUT BEHAVIORAL DISTURBANCE (H): ICD-10-CM

## 2021-09-27 DIAGNOSIS — H61.23 BILATERAL IMPACTED CERUMEN: Primary | ICD-10-CM

## 2021-09-27 NOTE — PROGRESS NOTES
"[place] [facility]  [place] Medical Record Number:  [id number]  Place of Service where encounter took place:  [facility]  [facility] ([facility]) [[id number]]  Chief Complaint   Patient presents with     Cerumen (impacted)       HPI:    Aleksandar Decker  is a 89 year old (8/28/1926), who is being seen today for an episodic care visit.  HPI information obtained from: facility chart records, facility staff, patient report, [facility] chart review and family/first contact dtr report. Today's concern is: cerumen, alzheimer's, tremor.  Has recurrent cerumen bilat ear canals.  Has hearing loss, wears hearing aids. For alzheimer's cont. On aricept, namenda. Had GDR trial of namenda-had increased anxiety, agitation.  namenda increased to previous dose.  Per staff, mood, behaviors gen. Stable. Waking more in am for breakfast.  No recent reports of increased anxiety, agitation. Has R hand tremor at times, resting.  No recent increase in s/s.  No reports of increased difficulty with ADLs      Past Medical and Surgical History reviewed in Epic today.    MEDICATIONS:    Current Outpatient Medications   Medication Sig Dispense Refill     Cholecalciferol (VITAMIN D3) 50 MCG (2000 UT) TABS TAKE 1 TABLET BY MOUTH EVERY MORNING 28 tablet PRN     cyanocobalamin (CYANOCOBALAMIN) 1000 MCG/ML injection INJECT 1 ML (1000 MCG) AS DIRECTED ONCE A MONTH 1 mL 97     donepezil (ARICEPT) 10 MG tablet TAKE 1 TABLET BY MOUTH EVERY MORNING 28 tablet PRN     EAR DROPS 6.5 % otic solution GIVE 4 DROPS IN BOTH EARS FOR 5 DAYS EVERY MONTH;IRRIGATE AS NEEDED AFTER DROPS 15 mL 97     memantine (NAMENDA) 5 MG tablet TAKE 1 TABLET BY MOUTH TWICE DAILY 56 tablet PRN     omeprazole (PRILOSEC) 20 MG DR capsule TAKE 1 CAPSULE BY MOUTH EVERY MORNING 28 capsule PRN     [employer] SAFETY SYRINGE 25G X 1\" 3 ML MISC USE AS DIRECTED TO INJECT CYANOCOBALAMIN ONCE A MONTH 1 each 11         REVIEW OF SYSTEMS:  Unobtainable secondary to cognitive " impairment. Reports feeling good    Objective:  /67   Pulse 70   Resp 18   SpO2 95%   Exam:  GENERAL APPEARANCE:  Alert, in no distress, appears healthy  ENT:  Mouth and posterior oropharynx normal, moist mucous membranes, [place], bilat cerumen present  EYES:  EOM, conjunctivae, lids, pupils and irises normal, PERRL  RESP:  respiratory effort and palpation of chest normal, lungs clear to auscultation , no respiratory distress  CV:  Palpation and auscultation of heart done , regular rate and rhythm, no murmur, rub, or gallop, no edema  ABDOMEN:  normal bowel sounds, soft, nontender, no hepatosplenomegaly or other masses, no guarding or rebound  M/S:   Gait and station normal  muscle strength 5/5 all 4 ext., normal tone  NEURO:   Cranial nerves 2-12 are normal tested and grossly at patient's baseline, no tremor seen during exam  PSYCH:  insight and judgement impaired, memory impaired , affect and mood normal    Labs:     Most Recent 3 CBC's:Recent Labs   Lab Test 01/12/21  1023 10/14/20  0756 09/08/20  1006   WBC 4.9 4.9 3.7*   HGB 16.8 16.5 16.8   MCV 97 96 98    181 183     Most Recent 3 BMP's:Recent Labs   Lab Test 01/12/21  1023 10/14/20  0756 09/08/20  1006    143 141   POTASSIUM 4.4 4.4 4.7   CHLORIDE 105 105 104   CO2 28 30 30   BUN 14 13 10   CR 1.20 1.09 1.01   ANIONGAP 9 8 7   MARIBEL 8.9 8.5 8.8   GLC 69* 92 83     Most Recent TSH and T4:Recent Labs   Lab Test 10/14/20  0756   TSH 2.36       ASSESSMENT/PLAN:  (H61.23) Bilateral impacted cerumen  (primary encounter diagnosis)  Comment: recurrent. [place], wears hearing aids  Plan: 1. Debrox gtts bilat ears x 7 days  2. Assess for irrigation need  3. Monitor for further increase in hearing loss    (G30.1,  F02.80) Late onset Alzheimer's disease without behavioral disturbance (H)  Comment: memory loss. Mood, behaviors gen. stable  Plan: 1. Cont. Aricept, namenda  2. Monitor for reports of anxiety, agitation  3. Cont. To invite to breakfast in am,  invite to activities throughout the day  4. Cont. Secure memory care unit    (R25.1) Tremor  Comment: R hand.  No recent increase in s/s  Plan: 1. Monitor for worsening tremor  2. Monitor for difficulty with ADLs  3. Cbc, bmp, tsh tomorrow    Electronically signed by:  LEIA Whyte CNP

## 2021-09-27 NOTE — LETTER
"    9/27/2021        RE: Aleksandar Decker  C/o Arelis Love  [address] [address]        [facility] [facility]  [place] Medical Record Number:  [id number]  Place of Service where encounter took place:  [facility]  [facility] ([facility]) [[id number]]  Chief Complaint   Patient presents with     Cerumen (impacted)       HPI:    Aleksandar Decker  is a 89 year old (8/28/1926), who is being seen today for an episodic care visit.  HPI information obtained from: facility chart records, facility staff, patient report, [facility] chart review and family/first contact dtr report. Today's concern is: cerumen, alzheimer's, tremor.  Has recurrent cerumen bilat ear canals.  Has hearing loss, wears hearing aids. For alzheimer's cont. On aricept, namenda. Had GDR trial of namenda-had increased anxiety, agitation.  namenda increased to previous dose.  Per staff, mood, behaviors gen. Stable. Waking more in am for breakfast.  No recent reports of increased anxiety, agitation. Has R hand tremor at times, resting.  No recent increase in s/s.  No reports of increased difficulty with ADLs      Past Medical and Surgical History reviewed in Epic today.    MEDICATIONS:    Current Outpatient Medications   Medication Sig Dispense Refill     Cholecalciferol (VITAMIN D3) 50 MCG (2000 UT) TABS TAKE 1 TABLET BY MOUTH EVERY MORNING 28 tablet PRN     cyanocobalamin (CYANOCOBALAMIN) 1000 MCG/ML injection INJECT 1 ML (1000 MCG) AS DIRECTED ONCE A MONTH 1 mL 97     donepezil (ARICEPT) 10 MG tablet TAKE 1 TABLET BY MOUTH EVERY MORNING 28 tablet PRN     EAR DROPS 6.5 % otic solution GIVE 4 DROPS IN BOTH EARS FOR 5 DAYS EVERY MONTH;IRRIGATE AS NEEDED AFTER DROPS 15 mL 97     memantine (NAMENDA) 5 MG tablet TAKE 1 TABLET BY MOUTH TWICE DAILY 56 tablet PRN     omeprazole (PRILOSEC) 20 MG DR capsule TAKE 1 CAPSULE BY MOUTH EVERY MORNING 28 capsule PRN     Veodin SAFETY SYRINGE 25G X 1\" 3 ML MISC USE AS DIRECTED TO INJECT " CYANOCOBALAMIN ONCE A MONTH 1 each 11         REVIEW OF SYSTEMS:  Unobtainable secondary to cognitive impairment. Reports feeling good    Objective:  /67   Pulse 70   Resp 18   SpO2 95%   Exam:  GENERAL APPEARANCE:  Alert, in no distress, appears healthy  ENT:  Mouth and posterior oropharynx normal, moist mucous membranes, [place], bilat cerumen present  EYES:  EOM, conjunctivae, lids, pupils and irises normal, PERRL  RESP:  respiratory effort and palpation of chest normal, lungs clear to auscultation , no respiratory distress  CV:  Palpation and auscultation of heart done , regular rate and rhythm, no murmur, rub, or gallop, no edema  ABDOMEN:  normal bowel sounds, soft, nontender, no hepatosplenomegaly or other masses, no guarding or rebound  M/S:   Gait and station normal  muscle strength 5/5 all 4 ext., normal tone  NEURO:   Cranial nerves 2-12 are normal tested and grossly at patient's baseline, no tremor seen during exam  PSYCH:  insight and judgement impaired, memory impaired , affect and mood normal    Labs:     Most Recent 3 CBC's:Recent Labs   Lab Test 01/12/21  1023 10/14/20  0756 09/08/20  1006   WBC 4.9 4.9 3.7*   HGB 16.8 16.5 16.8   MCV 97 96 98    181 183     Most Recent 3 BMP's:Recent Labs   Lab Test 01/12/21  1023 10/14/20  0756 09/08/20  1006    143 141   POTASSIUM 4.4 4.4 4.7   CHLORIDE 105 105 104   CO2 28 30 30   BUN 14 13 10   CR 1.20 1.09 1.01   ANIONGAP 9 8 7   MARIBEL 8.9 8.5 8.8   GLC 69* 92 83     Most Recent TSH and T4:Recent Labs   Lab Test 10/14/20  0756   TSH 2.36       ASSESSMENT/PLAN:  (H61.23) Bilateral impacted cerumen  (primary encounter diagnosis)  Comment: recurrent. [place], wears hearing aids  Plan: 1. Debrox gtts bilat ears x 7 days  2. Assess for irrigation need  3. Monitor for further increase in hearing loss    (G30.1,  F02.80) Late onset Alzheimer's disease without behavioral disturbance (H)  Comment: memory loss. Mood, behaviors gen. stable  Plan: 1. Cont.  Aricept, namenda  2. Monitor for reports of anxiety, agitation  3. Cont. To invite to breakfast in am, invite to activities throughout the day  4. Cont. Secure memory care unit    (R25.1) Tremor  Comment: R hand.  No recent increase in s/s  Plan: 1. Monitor for worsening tremor  2. Monitor for difficulty with ADLs  3. Cbc, bmp, tsh tomorrow    Electronically signed by:  LEIA Whyte CNP                 Sincerely,        LEIA Whyte CNP

## 2021-09-28 LAB
ANION GAP SERPL CALCULATED.3IONS-SCNC: 9 MMOL/L (ref 5–18)
BASOPHILS # BLD AUTO: 0 10E3/UL (ref 0–0.2)
BASOPHILS NFR BLD AUTO: 1 %
BUN SERPL-MCNC: 10 MG/DL (ref 8–28)
CALCIUM SERPL-MCNC: 8.7 MG/DL (ref 8.5–10.5)
CHLORIDE BLD-SCNC: 107 MMOL/L (ref 98–107)
CO2 SERPL-SCNC: 26 MMOL/L (ref 22–31)
CREAT SERPL-MCNC: 1.01 MG/DL (ref 0.7–1.3)
EOSINOPHIL # BLD AUTO: 0.1 10E3/UL (ref 0–0.7)
EOSINOPHIL NFR BLD AUTO: 3 %
ERYTHROCYTE [DISTWIDTH] IN BLOOD BY AUTOMATED COUNT: 13.1 % (ref 10–15)
GFR SERPL CREATININE-BSD FRML MDRD: 63 ML/MIN/1.73M2
GLUCOSE BLD-MCNC: 86 MG/DL (ref 70–125)
HCT VFR BLD AUTO: 47.3 % (ref 40–53)
HGB BLD-MCNC: 15.5 G/DL (ref 13.3–17.7)
IMM GRANULOCYTES # BLD: 0 10E3/UL
IMM GRANULOCYTES NFR BLD: 0 %
LYMPHOCYTES # BLD AUTO: 1.2 10E3/UL (ref 0.8–5.3)
LYMPHOCYTES NFR BLD AUTO: 31 %
MCH RBC QN AUTO: 31.5 PG (ref 26.5–33)
MCHC RBC AUTO-ENTMCNC: 32.8 G/DL (ref 31.5–36.5)
MCV RBC AUTO: 96 FL (ref 78–100)
MONOCYTES # BLD AUTO: 0.4 10E3/UL (ref 0–1.3)
MONOCYTES NFR BLD AUTO: 11 %
NEUTROPHILS # BLD AUTO: 2.2 10E3/UL (ref 1.6–8.3)
NEUTROPHILS NFR BLD AUTO: 54 %
NRBC # BLD AUTO: 0 10E3/UL
NRBC BLD AUTO-RTO: 0 /100
PLATELET # BLD AUTO: 188 10E3/UL (ref 150–450)
POTASSIUM BLD-SCNC: 4.4 MMOL/L (ref 3.5–5)
RBC # BLD AUTO: 4.92 10E6/UL (ref 4.4–5.9)
SODIUM SERPL-SCNC: 142 MMOL/L (ref 136–145)
TSH SERPL DL<=0.005 MIU/L-ACNC: 2.65 UIU/ML (ref 0.3–5)
WBC # BLD AUTO: 4 10E3/UL (ref 4–11)

## 2021-09-28 PROCEDURE — 85025 COMPLETE CBC W/AUTO DIFF WBC: CPT | Mod: ORL | Performed by: NURSE PRACTITIONER

## 2021-09-28 PROCEDURE — P9603 ONE-WAY ALLOW PRORATED MILES: HCPCS | Mod: ORL | Performed by: NURSE PRACTITIONER

## 2021-09-28 PROCEDURE — 84443 ASSAY THYROID STIM HORMONE: CPT | Mod: ORL | Performed by: NURSE PRACTITIONER

## 2021-09-28 PROCEDURE — 80048 BASIC METABOLIC PNL TOTAL CA: CPT | Mod: ORL | Performed by: NURSE PRACTITIONER

## 2021-09-28 PROCEDURE — 36415 COLL VENOUS BLD VENIPUNCTURE: CPT | Mod: ORL | Performed by: NURSE PRACTITIONER

## 2021-10-04 ENCOUNTER — ASSISTED LIVING VISIT (OUTPATIENT)
Dept: GERIATRICS | Facility: CLINIC | Age: 86
End: 2021-10-04
Payer: MEDICARE

## 2021-10-04 VITALS
DIASTOLIC BLOOD PRESSURE: 77 MMHG | RESPIRATION RATE: 16 BRPM | HEART RATE: 67 BPM | OXYGEN SATURATION: 95 % | SYSTOLIC BLOOD PRESSURE: 130 MMHG

## 2021-10-04 DIAGNOSIS — H61.23 BILATERAL IMPACTED CERUMEN: Primary | ICD-10-CM

## 2021-10-04 DIAGNOSIS — K21.9 GASTROESOPHAGEAL REFLUX DISEASE WITHOUT ESOPHAGITIS: ICD-10-CM

## 2021-10-04 NOTE — LETTER
"    10/4/2021        RE: Aleksandar Decker  C/o Arelis Love  [address] [address]        [place] [facility]  [place] Medical Record Number:  [id number]  Place of Service where encounter took place:  [facility] [employer]  [facility] ([facility]) [[id number]]  Chief Complaint   Patient presents with     Cerumen (impacted)       HPI:    Aleksandar Decker  is a 89 year old (8/28/1926), who is being seen today for an episodic care visit.  HPI information obtained from: facility chart records, facility staff, patient report and [facility] chart review. Today's concern is: cerumen, GERD.  Has recurrent bilat cerumen.  Started on debrox ear gtts last week.  Has hearing loss. For GERD cont. On prilosec.  Per staff, po intake stable.  No reports of increased s/s.  hgb stable.       Past Medical and Surgical History reviewed in Epic today.    MEDICATIONS:    Current Outpatient Medications   Medication Sig Dispense Refill     Cholecalciferol (VITAMIN D3) 50 MCG (2000 UT) TABS TAKE 1 TABLET BY MOUTH EVERY MORNING 28 tablet PRN     cyanocobalamin (CYANOCOBALAMIN) 1000 MCG/ML injection INJECT 1 ML (1000 MCG) AS DIRECTED ONCE A MONTH 1 mL 97     donepezil (ARICEPT) 10 MG tablet TAKE 1 TABLET BY MOUTH EVERY MORNING 28 tablet PRN     EAR DROPS 6.5 % otic solution GIVE 4 DROPS IN BOTH EARS FOR 5 DAYS EVERY MONTH;IRRIGATE AS NEEDED AFTER DROPS 15 mL 97     memantine (NAMENDA) 5 MG tablet TAKE 1 TABLET BY MOUTH TWICE DAILY 56 tablet PRN     omeprazole (PRILOSEC) 20 MG DR capsule TAKE 1 CAPSULE BY MOUTH EVERY MORNING 28 capsule PRN     VANISHPOINT SAFETY SYRINGE 25G X 1\" 3 ML MISC USE AS DIRECTED TO INJECT CYANOCOBALAMIN ONCE A MONTH 1 each 11         REVIEW OF SYSTEMS:  Unobtainable secondary to cognitive impairment. Reports feeling ok today.  No stomach troubles    Objective:  /77   Pulse 67   Resp 16   SpO2 95%   Exam:  GENERAL APPEARANCE:  Alert, in no distress, appears healthy  ENT:  Mouth and posterior " oropharynx normal, moist mucous membranes, [place], bilat ear cansl free of cerumen s/p cleaing with curette  EYES:  EOM, conjunctivae, lids, pupils and irises normal, PERRL  RESP:  respiratory effort and palpation of chest normal, lungs clear to auscultation , no respiratory distress  CV:  Palpation and auscultation of heart done , regular rate and rhythm, no murmur, rub, or gallop, no edema  ABDOMEN:  normal bowel sounds, soft, nontender, no hepatosplenomegaly or other masses, no guarding or rebound  M/S:   muscle strength 5/5 all 4 ext, normal tone  NEURO:   Cranial nerves 2-12 are normal tested and grossly at patient's baseline, no tremor seen  PSYCH:  insight and judgement impaired, memory impaired , affect and mood normal    Labs:     Most Recent 3 CBC's:Recent Labs   Lab Test 09/28/21  1037 01/12/21  1023 10/14/20  0756   WBC 4.0 4.9 4.9   HGB 15.5 16.8 16.5   MCV 96 97 96    177 181     Most Recent 3 BMP's:Recent Labs   Lab Test 09/28/21  1037 01/12/21  1023 10/14/20  0756    142 143   POTASSIUM 4.4 4.4 4.4   CHLORIDE 107 105 105   CO2 26 28 30   BUN 10 14 13   CR 1.01 1.20 1.09   ANIONGAP 9 9 8   MARIBEL 8.7 8.9 8.5   GLC 86 69* 92     Most Recent TSH and T4:Recent Labs   Lab Test 09/28/21  1037   TSH 2.65       ASSESSMENT/PLAN:  (H61.23) Bilateral impacted cerumen  (primary encounter diagnosis)  Comment: clear s/p cleaning with curette   Plan: 1. Discontinue debrox gtts  2. Monitor for further hearing loss  3. Reassess for cerumen in next 2-4 mos  4. Repeat debrox prn    (K21.9) Gastroesophageal reflux disease without esophagitis  Comment: currently stable.  hgb stable  Plan: 1. Cont. prilosec  2. Follow po intake, wt.  3. Monitor for reports of nausea, decrease in po intake  4. hgb in next 4-6 mos    Electronically signed by:  LEIA Whyte CNP                 Sincerely,        LEIA Whyte CNP

## 2021-10-04 NOTE — PROGRESS NOTES
"[facility] [place] [facility]  [place] Medical Record Number:  [id number]  Place of Service where encounter took place:  [facility]  [facility] ([facility]) [[id number]]  Chief Complaint   Patient presents with     Cerumen (impacted)       HPI:    Aleksandar Decker  is a 89 year old (8/28/1926), who is being seen today for an episodic care visit.  HPI information obtained from: facility chart records, facility staff, patient report and [facility] chart review. Today's concern is: cerumen, GERD.  Has recurrent bilat cerumen.  Started on debrox ear gtts last week.  Has hearing loss. For GERD cont. On prilosec.  Per staff, po intake stable.  No reports of increased s/s.  hgb stable.       Past Medical and Surgical History reviewed in Epic today.    MEDICATIONS:    Current Outpatient Medications   Medication Sig Dispense Refill     Cholecalciferol (VITAMIN D3) 50 MCG (2000 UT) TABS TAKE 1 TABLET BY MOUTH EVERY MORNING 28 tablet PRN     cyanocobalamin (CYANOCOBALAMIN) 1000 MCG/ML injection INJECT 1 ML (1000 MCG) AS DIRECTED ONCE A MONTH 1 mL 97     donepezil (ARICEPT) 10 MG tablet TAKE 1 TABLET BY MOUTH EVERY MORNING 28 tablet PRN     EAR DROPS 6.5 % otic solution GIVE 4 DROPS IN BOTH EARS FOR 5 DAYS EVERY MONTH;IRRIGATE AS NEEDED AFTER DROPS 15 mL 97     memantine (NAMENDA) 5 MG tablet TAKE 1 TABLET BY MOUTH TWICE DAILY 56 tablet PRN     omeprazole (PRILOSEC) 20 MG DR capsule TAKE 1 CAPSULE BY MOUTH EVERY MORNING 28 capsule PRN     VANISHPOINT SAFETY SYRINGE 25G X 1\" 3 ML MISC USE AS DIRECTED TO INJECT CYANOCOBALAMIN ONCE A MONTH 1 each 11         REVIEW OF SYSTEMS:  Unobtainable secondary to cognitive impairment. Reports feeling ok today.  No stomach troubles    Objective:  /77   Pulse 67   Resp 16   SpO2 95%   Exam:  GENERAL APPEARANCE:  Alert, in no distress, appears healthy  ENT:  Mouth and posterior oropharynx normal, moist mucous membranes, [place], bilat ear cansl free of cerumen s/p cleaing with " curette  EYES:  EOM, conjunctivae, lids, pupils and irises normal, PERRL  RESP:  respiratory effort and palpation of chest normal, lungs clear to auscultation , no respiratory distress  CV:  Palpation and auscultation of heart done , regular rate and rhythm, no murmur, rub, or gallop, no edema  ABDOMEN:  normal bowel sounds, soft, nontender, no hepatosplenomegaly or other masses, no guarding or rebound  M/S:   muscle strength 5/5 all 4 ext, normal tone  NEURO:   Cranial nerves 2-12 are normal tested and grossly at patient's baseline, no tremor seen  PSYCH:  insight and judgement impaired, memory impaired , affect and mood normal    Labs:     Most Recent 3 CBC's:Recent Labs   Lab Test 09/28/21  1037 01/12/21  1023 10/14/20  0756   WBC 4.0 4.9 4.9   HGB 15.5 16.8 16.5   MCV 96 97 96    177 181     Most Recent 3 BMP's:Recent Labs   Lab Test 09/28/21  1037 01/12/21  1023 10/14/20  0756    142 143   POTASSIUM 4.4 4.4 4.4   CHLORIDE 107 105 105   CO2 26 28 30   BUN 10 14 13   CR 1.01 1.20 1.09   ANIONGAP 9 9 8   MARIBEL 8.7 8.9 8.5   GLC 86 69* 92     Most Recent TSH and T4:Recent Labs   Lab Test 09/28/21  1037   TSH 2.65       ASSESSMENT/PLAN:  (H61.23) Bilateral impacted cerumen  (primary encounter diagnosis)  Comment: clear s/p cleaning with curette   Plan: 1. Discontinue debrox gtts  2. Monitor for further hearing loss  3. Reassess for cerumen in next 2-4 mos  4. Repeat debrox prn    (K21.9) Gastroesophageal reflux disease without esophagitis  Comment: currently stable.  hgb stable  Plan: 1. Cont. prilosec  2. Follow po intake, wt.  3. Monitor for reports of nausea, decrease in po intake  4. hgb in next 4-6 mos    Electronically signed by:  LEIA Whyte CNP

## 2021-10-20 ENCOUNTER — LAB REQUISITION (OUTPATIENT)
Dept: LAB | Facility: CLINIC | Age: 86
End: 2021-10-20
Payer: MEDICARE

## 2021-10-20 DIAGNOSIS — E53.8 DEFICIENCY OF OTHER SPECIFIED B GROUP VITAMINS: ICD-10-CM

## 2021-10-20 DIAGNOSIS — G30.1 ALZHEIMER'S DISEASE WITH LATE ONSET (CODE) (H): ICD-10-CM

## 2021-10-21 PROCEDURE — 36415 COLL VENOUS BLD VENIPUNCTURE: CPT | Mod: ORL | Performed by: NURSE PRACTITIONER

## 2021-10-21 PROCEDURE — 85027 COMPLETE CBC AUTOMATED: CPT | Mod: ORL | Performed by: NURSE PRACTITIONER

## 2021-10-21 PROCEDURE — 82306 VITAMIN D 25 HYDROXY: CPT | Mod: ORL | Performed by: NURSE PRACTITIONER

## 2021-10-21 PROCEDURE — 82607 VITAMIN B-12: CPT | Mod: ORL | Performed by: NURSE PRACTITIONER

## 2021-10-21 PROCEDURE — P9603 ONE-WAY ALLOW PRORATED MILES: HCPCS | Mod: ORL | Performed by: NURSE PRACTITIONER

## 2021-10-21 PROCEDURE — 80048 BASIC METABOLIC PNL TOTAL CA: CPT | Mod: ORL | Performed by: NURSE PRACTITIONER

## 2021-10-22 LAB
ANION GAP SERPL CALCULATED.3IONS-SCNC: 8 MMOL/L (ref 5–18)
BUN SERPL-MCNC: 10 MG/DL (ref 8–28)
CALCIUM SERPL-MCNC: 9.2 MG/DL (ref 8.5–10.5)
CHLORIDE BLD-SCNC: 104 MMOL/L (ref 98–107)
CO2 SERPL-SCNC: 28 MMOL/L (ref 22–31)
CREAT SERPL-MCNC: 0.88 MG/DL (ref 0.7–1.3)
ERYTHROCYTE [DISTWIDTH] IN BLOOD BY AUTOMATED COUNT: 13.1 % (ref 10–15)
GFR SERPL CREATININE-BSD FRML MDRD: 73 ML/MIN/1.73M2
GLUCOSE BLD-MCNC: 80 MG/DL (ref 70–125)
HCT VFR BLD AUTO: 48.4 % (ref 40–53)
HGB BLD-MCNC: 16.2 G/DL (ref 13.3–17.7)
MCH RBC QN AUTO: 32.4 PG (ref 26.5–33)
MCHC RBC AUTO-ENTMCNC: 33.5 G/DL (ref 31.5–36.5)
MCV RBC AUTO: 97 FL (ref 78–100)
PLATELET # BLD AUTO: 167 10E3/UL (ref 150–450)
POTASSIUM BLD-SCNC: 4.8 MMOL/L (ref 3.5–5)
RBC # BLD AUTO: 5 10E6/UL (ref 4.4–5.9)
SODIUM SERPL-SCNC: 140 MMOL/L (ref 136–145)
VIT B12 SERPL-MCNC: 777 PG/ML (ref 213–816)
WBC # BLD AUTO: 3.5 10E3/UL (ref 4–11)

## 2021-10-25 LAB — DEPRECATED CALCIDIOL+CALCIFEROL SERPL-MC: 42 UG/L (ref 30–80)

## 2021-10-27 ENCOUNTER — LAB REQUISITION (OUTPATIENT)
Dept: LAB | Facility: CLINIC | Age: 86
End: 2021-10-27
Payer: MEDICARE

## 2021-10-27 DIAGNOSIS — D72.819 DECREASED WHITE BLOOD CELL COUNT, UNSPECIFIED: ICD-10-CM

## 2021-10-28 LAB
ERYTHROCYTE [DISTWIDTH] IN BLOOD BY AUTOMATED COUNT: 13 % (ref 10–15)
HCT VFR BLD AUTO: 46.2 % (ref 40–53)
HGB BLD-MCNC: 15.2 G/DL (ref 13.3–17.7)
MCH RBC QN AUTO: 31.2 PG (ref 26.5–33)
MCHC RBC AUTO-ENTMCNC: 32.9 G/DL (ref 31.5–36.5)
MCV RBC AUTO: 95 FL (ref 78–100)
PLATELET # BLD AUTO: 179 10E3/UL (ref 150–450)
RBC # BLD AUTO: 4.87 10E6/UL (ref 4.4–5.9)
WBC # BLD AUTO: 4.6 10E3/UL (ref 4–11)

## 2021-10-28 PROCEDURE — 36415 COLL VENOUS BLD VENIPUNCTURE: CPT | Mod: ORL | Performed by: NURSE PRACTITIONER

## 2021-10-28 PROCEDURE — 85027 COMPLETE CBC AUTOMATED: CPT | Mod: ORL | Performed by: NURSE PRACTITIONER

## 2021-10-28 PROCEDURE — P9603 ONE-WAY ALLOW PRORATED MILES: HCPCS | Mod: ORL | Performed by: NURSE PRACTITIONER

## 2021-11-01 ENCOUNTER — LAB REQUISITION (OUTPATIENT)
Dept: LAB | Facility: CLINIC | Age: 86
End: 2021-11-01
Payer: MEDICARE

## 2021-11-01 DIAGNOSIS — Z03.818 ENCOUNTER FOR OBSERVATION FOR SUSPECTED EXPOSURE TO OTHER BIOLOGICAL AGENTS RULED OUT: ICD-10-CM

## 2021-11-05 ENCOUNTER — LAB REQUISITION (OUTPATIENT)
Dept: LAB | Facility: CLINIC | Age: 86
End: 2021-11-05
Payer: MEDICARE

## 2021-11-05 DIAGNOSIS — Z03.818 ENCOUNTER FOR OBSERVATION FOR SUSPECTED EXPOSURE TO OTHER BIOLOGICAL AGENTS RULED OUT: ICD-10-CM

## 2021-11-10 PROCEDURE — U0005 INFEC AGEN DETEC AMPLI PROBE: HCPCS | Mod: ORL | Performed by: FAMILY MEDICINE

## 2021-11-11 ENCOUNTER — LAB REQUISITION (OUTPATIENT)
Dept: LAB | Facility: CLINIC | Age: 86
End: 2021-11-11
Payer: MEDICARE

## 2021-11-11 DIAGNOSIS — Z03.818 ENCOUNTER FOR OBSERVATION FOR SUSPECTED EXPOSURE TO OTHER BIOLOGICAL AGENTS RULED OUT: ICD-10-CM

## 2021-11-12 LAB — SARS-COV-2 RNA RESP QL NAA+PROBE: NEGATIVE

## 2021-11-15 PROCEDURE — U0005 INFEC AGEN DETEC AMPLI PROBE: HCPCS | Mod: ORL | Performed by: FAMILY MEDICINE

## 2021-11-16 LAB — SARS-COV-2 RNA RESP QL NAA+PROBE: NEGATIVE

## 2021-11-18 ENCOUNTER — LAB REQUISITION (OUTPATIENT)
Dept: LAB | Facility: CLINIC | Age: 86
End: 2021-11-18
Payer: MEDICARE

## 2021-11-18 DIAGNOSIS — Z03.818 ENCOUNTER FOR OBSERVATION FOR SUSPECTED EXPOSURE TO OTHER BIOLOGICAL AGENTS RULED OUT: ICD-10-CM

## 2021-11-22 PROCEDURE — U0005 INFEC AGEN DETEC AMPLI PROBE: HCPCS | Mod: ORL | Performed by: FAMILY MEDICINE

## 2021-11-23 LAB — SARS-COV-2 RNA RESP QL NAA+PROBE: NEGATIVE

## 2021-11-24 ENCOUNTER — LAB REQUISITION (OUTPATIENT)
Dept: LAB | Facility: CLINIC | Age: 86
End: 2021-11-24
Payer: MEDICARE

## 2021-11-24 DIAGNOSIS — Z03.818 ENCOUNTER FOR OBSERVATION FOR SUSPECTED EXPOSURE TO OTHER BIOLOGICAL AGENTS RULED OUT: ICD-10-CM

## 2021-11-29 PROCEDURE — U0003 INFECTIOUS AGENT DETECTION BY NUCLEIC ACID (DNA OR RNA); SEVERE ACUTE RESPIRATORY SYNDROME CORONAVIRUS 2 (SARS-COV-2) (CORONAVIRUS DISEASE [COVID-19]), AMPLIFIED PROBE TECHNIQUE, MAKING USE OF HIGH THROUGHPUT TECHNOLOGIES AS DESCRIBED BY CMS-2020-01-R: HCPCS | Mod: ORL | Performed by: FAMILY MEDICINE

## 2021-11-30 LAB — SARS-COV-2 RNA RESP QL NAA+PROBE: NEGATIVE

## 2022-03-16 ENCOUNTER — LAB REQUISITION (OUTPATIENT)
Dept: LAB | Facility: CLINIC | Age: 87
End: 2022-03-16
Payer: MEDICARE

## 2022-03-16 DIAGNOSIS — R53.1 WEAKNESS: ICD-10-CM

## 2022-03-17 LAB
ALBUMIN SERPL-MCNC: 3.5 G/DL (ref 3.5–5)
ALP SERPL-CCNC: 70 U/L (ref 45–120)
ALT SERPL W P-5'-P-CCNC: 9 U/L (ref 0–45)
ANION GAP SERPL CALCULATED.3IONS-SCNC: 10 MMOL/L (ref 5–18)
AST SERPL W P-5'-P-CCNC: 20 U/L (ref 0–40)
BILIRUB SERPL-MCNC: 0.9 MG/DL (ref 0–1)
BUN SERPL-MCNC: 9 MG/DL (ref 8–28)
CALCIUM SERPL-MCNC: 9.2 MG/DL (ref 8.5–10.5)
CHLORIDE BLD-SCNC: 104 MMOL/L (ref 98–107)
CO2 SERPL-SCNC: 29 MMOL/L (ref 22–31)
CREAT SERPL-MCNC: 1.03 MG/DL (ref 0.7–1.3)
ERYTHROCYTE [DISTWIDTH] IN BLOOD BY AUTOMATED COUNT: 13.2 % (ref 10–15)
GFR SERPL CREATININE-BSD FRML MDRD: 67 ML/MIN/1.73M2
GLUCOSE BLD-MCNC: 87 MG/DL (ref 70–125)
HCT VFR BLD AUTO: 51.3 % (ref 40–53)
HGB BLD-MCNC: 16.5 G/DL (ref 13.3–17.7)
MCH RBC QN AUTO: 32.2 PG (ref 26.5–33)
MCHC RBC AUTO-ENTMCNC: 32.2 G/DL (ref 31.5–36.5)
MCV RBC AUTO: 100 FL (ref 78–100)
PLATELET # BLD AUTO: 225 10E3/UL (ref 150–450)
POTASSIUM BLD-SCNC: 4.6 MMOL/L (ref 3.5–5)
PROT SERPL-MCNC: 7 G/DL (ref 6–8)
RBC # BLD AUTO: 5.13 10E6/UL (ref 4.4–5.9)
SODIUM SERPL-SCNC: 143 MMOL/L (ref 136–145)
WBC # BLD AUTO: 4.6 10E3/UL (ref 4–11)

## 2022-03-17 PROCEDURE — 36415 COLL VENOUS BLD VENIPUNCTURE: CPT | Mod: ORL | Performed by: FAMILY MEDICINE

## 2022-03-17 PROCEDURE — P9603 ONE-WAY ALLOW PRORATED MILES: HCPCS | Mod: ORL | Performed by: FAMILY MEDICINE

## 2022-03-17 PROCEDURE — 80053 COMPREHEN METABOLIC PANEL: CPT | Mod: ORL | Performed by: FAMILY MEDICINE

## 2022-03-17 PROCEDURE — 85027 COMPLETE CBC AUTOMATED: CPT | Mod: ORL | Performed by: FAMILY MEDICINE

## 2022-04-12 ENCOUNTER — LAB REQUISITION (OUTPATIENT)
Dept: LAB | Facility: CLINIC | Age: 87
End: 2022-04-12
Payer: MEDICARE

## 2022-04-12 DIAGNOSIS — Z03.818 ENCOUNTER FOR OBSERVATION FOR SUSPECTED EXPOSURE TO OTHER BIOLOGICAL AGENTS RULED OUT: ICD-10-CM

## 2022-04-12 PROCEDURE — U0005 INFEC AGEN DETEC AMPLI PROBE: HCPCS | Mod: ORL | Performed by: FAMILY MEDICINE

## 2022-04-13 LAB — SARS-COV-2 RNA RESP QL NAA+PROBE: NEGATIVE

## 2022-04-14 ENCOUNTER — DOCUMENTATION ONLY (OUTPATIENT)
Dept: OTHER | Facility: CLINIC | Age: 87
End: 2022-04-14
Payer: MEDICARE

## 2022-05-04 PROCEDURE — U0005 INFEC AGEN DETEC AMPLI PROBE: HCPCS | Mod: ORL | Performed by: FAMILY MEDICINE

## 2022-05-05 ENCOUNTER — LAB REQUISITION (OUTPATIENT)
Dept: LAB | Facility: CLINIC | Age: 87
End: 2022-05-05
Payer: MEDICARE

## 2022-05-05 DIAGNOSIS — Z03.818 ENCOUNTER FOR OBSERVATION FOR SUSPECTED EXPOSURE TO OTHER BIOLOGICAL AGENTS RULED OUT: ICD-10-CM

## 2022-05-05 LAB — SARS-COV-2 RNA RESP QL NAA+PROBE: POSITIVE

## 2022-08-02 ENCOUNTER — LAB REQUISITION (OUTPATIENT)
Dept: LAB | Facility: CLINIC | Age: 87
End: 2022-08-02
Payer: MEDICARE

## 2022-08-02 DIAGNOSIS — R55 SYNCOPE AND COLLAPSE: ICD-10-CM

## 2022-08-04 LAB
ALBUMIN SERPL BCG-MCNC: 3.3 G/DL (ref 3.5–5.2)
ALP SERPL-CCNC: 67 U/L (ref 40–129)
ALT SERPL W P-5'-P-CCNC: 7 U/L (ref 10–50)
ANION GAP SERPL CALCULATED.3IONS-SCNC: 9 MMOL/L (ref 7–15)
AST SERPL W P-5'-P-CCNC: 20 U/L (ref 10–50)
BILIRUB SERPL-MCNC: 0.5 MG/DL
BUN SERPL-MCNC: 9.3 MG/DL (ref 8–23)
CALCIUM SERPL-MCNC: 8.9 MG/DL (ref 8.2–9.6)
CHLORIDE SERPL-SCNC: 103 MMOL/L (ref 98–107)
CREAT SERPL-MCNC: 0.93 MG/DL (ref 0.67–1.17)
DEPRECATED HCO3 PLAS-SCNC: 29 MMOL/L (ref 22–29)
ERYTHROCYTE [DISTWIDTH] IN BLOOD BY AUTOMATED COUNT: 13.4 % (ref 10–15)
GFR SERPL CREATININE-BSD FRML MDRD: 76 ML/MIN/1.73M2
GLUCOSE SERPL-MCNC: 78 MG/DL (ref 70–99)
HCT VFR BLD AUTO: 43.9 % (ref 40–53)
HGB BLD-MCNC: 14.1 G/DL (ref 13.3–17.7)
MCH RBC QN AUTO: 31.5 PG (ref 26.5–33)
MCHC RBC AUTO-ENTMCNC: 32.1 G/DL (ref 31.5–36.5)
MCV RBC AUTO: 98 FL (ref 78–100)
PLATELET # BLD AUTO: 175 10E3/UL (ref 150–450)
POTASSIUM SERPL-SCNC: 4.1 MMOL/L (ref 3.4–5.3)
PROT SERPL-MCNC: 5.6 G/DL (ref 6.4–8.3)
RBC # BLD AUTO: 4.48 10E6/UL (ref 4.4–5.9)
SODIUM SERPL-SCNC: 141 MMOL/L (ref 136–145)
WBC # BLD AUTO: 4.2 10E3/UL (ref 4–11)

## 2022-08-04 PROCEDURE — 85027 COMPLETE CBC AUTOMATED: CPT | Mod: ORL | Performed by: FAMILY MEDICINE

## 2022-08-04 PROCEDURE — P9603 ONE-WAY ALLOW PRORATED MILES: HCPCS | Mod: ORL | Performed by: FAMILY MEDICINE

## 2022-08-04 PROCEDURE — 80053 COMPREHEN METABOLIC PANEL: CPT | Mod: ORL | Performed by: FAMILY MEDICINE

## 2022-08-04 PROCEDURE — 36415 COLL VENOUS BLD VENIPUNCTURE: CPT | Mod: ORL | Performed by: FAMILY MEDICINE

## 2022-09-14 ENCOUNTER — LAB REQUISITION (OUTPATIENT)
Dept: LAB | Facility: CLINIC | Age: 87
End: 2022-09-14
Payer: MEDICARE

## 2022-09-14 DIAGNOSIS — E55.9 VITAMIN D DEFICIENCY, UNSPECIFIED: ICD-10-CM

## 2022-09-14 DIAGNOSIS — G30.1 ALZHEIMER'S DISEASE WITH LATE ONSET (CODE) (H): ICD-10-CM

## 2022-09-14 DIAGNOSIS — E53.8 DEFICIENCY OF OTHER SPECIFIED B GROUP VITAMINS: ICD-10-CM

## 2022-09-15 LAB
ANION GAP SERPL CALCULATED.3IONS-SCNC: 9 MMOL/L (ref 7–15)
BASOPHILS # BLD AUTO: 0 10E3/UL (ref 0–0.2)
BASOPHILS NFR BLD AUTO: 1 %
BUN SERPL-MCNC: 9 MG/DL (ref 8–23)
CALCIUM SERPL-MCNC: 9.4 MG/DL (ref 8.2–9.6)
CHLORIDE SERPL-SCNC: 100 MMOL/L (ref 98–107)
CREAT SERPL-MCNC: 0.97 MG/DL (ref 0.67–1.17)
DEPRECATED CALCIDIOL+CALCIFEROL SERPL-MC: 54 UG/L (ref 20–75)
DEPRECATED HCO3 PLAS-SCNC: 31 MMOL/L (ref 22–29)
EOSINOPHIL # BLD AUTO: 0.1 10E3/UL (ref 0–0.7)
EOSINOPHIL NFR BLD AUTO: 2 %
ERYTHROCYTE [DISTWIDTH] IN BLOOD BY AUTOMATED COUNT: 13.2 % (ref 10–15)
GFR SERPL CREATININE-BSD FRML MDRD: 71 ML/MIN/1.73M2
GLUCOSE SERPL-MCNC: 90 MG/DL (ref 70–99)
HCT VFR BLD AUTO: 51.1 % (ref 40–53)
HGB BLD-MCNC: 16.3 G/DL (ref 13.3–17.7)
IMM GRANULOCYTES # BLD: 0 10E3/UL
IMM GRANULOCYTES NFR BLD: 0 %
LYMPHOCYTES # BLD AUTO: 1.6 10E3/UL (ref 0.8–5.3)
LYMPHOCYTES NFR BLD AUTO: 34 %
MCH RBC QN AUTO: 31.8 PG (ref 26.5–33)
MCHC RBC AUTO-ENTMCNC: 31.9 G/DL (ref 31.5–36.5)
MCV RBC AUTO: 100 FL (ref 78–100)
MONOCYTES # BLD AUTO: 0.4 10E3/UL (ref 0–1.3)
MONOCYTES NFR BLD AUTO: 8 %
NEUTROPHILS # BLD AUTO: 2.6 10E3/UL (ref 1.6–8.3)
NEUTROPHILS NFR BLD AUTO: 55 %
NRBC # BLD AUTO: 0 10E3/UL
NRBC BLD AUTO-RTO: 0 /100
PLATELET # BLD AUTO: 195 10E3/UL (ref 150–450)
POTASSIUM SERPL-SCNC: 4.3 MMOL/L (ref 3.4–5.3)
RBC # BLD AUTO: 5.12 10E6/UL (ref 4.4–5.9)
SODIUM SERPL-SCNC: 140 MMOL/L (ref 136–145)
VIT B12 SERPL-MCNC: 760 PG/ML (ref 232–1245)
WBC # BLD AUTO: 4.8 10E3/UL (ref 4–11)

## 2022-09-15 PROCEDURE — 36415 COLL VENOUS BLD VENIPUNCTURE: CPT | Mod: ORL

## 2022-09-15 PROCEDURE — 82306 VITAMIN D 25 HYDROXY: CPT | Mod: ORL

## 2022-09-15 PROCEDURE — 80048 BASIC METABOLIC PNL TOTAL CA: CPT | Mod: ORL

## 2022-09-15 PROCEDURE — 82607 VITAMIN B-12: CPT | Mod: ORL

## 2022-09-15 PROCEDURE — 85025 COMPLETE CBC W/AUTO DIFF WBC: CPT | Mod: ORL

## 2022-09-15 PROCEDURE — P9603 ONE-WAY ALLOW PRORATED MILES: HCPCS | Mod: ORL

## 2023-03-01 ENCOUNTER — LAB REQUISITION (OUTPATIENT)
Dept: LAB | Facility: CLINIC | Age: 88
End: 2023-03-01
Payer: MEDICARE

## 2023-03-01 DIAGNOSIS — E55.9 VITAMIN D DEFICIENCY, UNSPECIFIED: ICD-10-CM

## 2023-03-01 DIAGNOSIS — F33.9 MAJOR DEPRESSIVE DISORDER, RECURRENT, UNSPECIFIED (H): ICD-10-CM

## 2023-03-01 DIAGNOSIS — E53.8 DEFICIENCY OF OTHER SPECIFIED B GROUP VITAMINS: ICD-10-CM

## 2023-03-02 LAB
ALBUMIN SERPL BCG-MCNC: 3.4 G/DL (ref 3.5–5.2)
ALP SERPL-CCNC: 78 U/L (ref 40–129)
ALT SERPL W P-5'-P-CCNC: 9 U/L (ref 10–50)
ANION GAP SERPL CALCULATED.3IONS-SCNC: 11 MMOL/L (ref 7–15)
AST SERPL W P-5'-P-CCNC: 19 U/L (ref 10–50)
BILIRUB SERPL-MCNC: 0.3 MG/DL
BUN SERPL-MCNC: 13.9 MG/DL (ref 8–23)
CALCIUM SERPL-MCNC: 9 MG/DL (ref 8.2–9.6)
CHLORIDE SERPL-SCNC: 104 MMOL/L (ref 98–107)
CREAT SERPL-MCNC: 1.01 MG/DL (ref 0.67–1.17)
DEPRECATED HCO3 PLAS-SCNC: 25 MMOL/L (ref 22–29)
ERYTHROCYTE [DISTWIDTH] IN BLOOD BY AUTOMATED COUNT: 13.4 % (ref 10–15)
GFR SERPL CREATININE-BSD FRML MDRD: 68 ML/MIN/1.73M2
GLUCOSE SERPL-MCNC: 93 MG/DL (ref 70–99)
HCT VFR BLD AUTO: 46.6 % (ref 40–53)
HGB BLD-MCNC: 14.6 G/DL (ref 13.3–17.7)
MCH RBC QN AUTO: 31.3 PG (ref 26.5–33)
MCHC RBC AUTO-ENTMCNC: 31.3 G/DL (ref 31.5–36.5)
MCV RBC AUTO: 100 FL (ref 78–100)
PLATELET # BLD AUTO: 173 10E3/UL (ref 150–450)
POTASSIUM SERPL-SCNC: 4.5 MMOL/L (ref 3.4–5.3)
PROT SERPL-MCNC: 5.6 G/DL (ref 6.4–8.3)
RBC # BLD AUTO: 4.67 10E6/UL (ref 4.4–5.9)
SODIUM SERPL-SCNC: 140 MMOL/L (ref 136–145)
VIT B12 SERPL-MCNC: 571 PG/ML (ref 232–1245)
WBC # BLD AUTO: 3.7 10E3/UL (ref 4–11)

## 2023-03-02 PROCEDURE — P9603 ONE-WAY ALLOW PRORATED MILES: HCPCS | Mod: ORL

## 2023-03-02 PROCEDURE — 36415 COLL VENOUS BLD VENIPUNCTURE: CPT | Mod: ORL

## 2023-03-02 PROCEDURE — 85027 COMPLETE CBC AUTOMATED: CPT | Mod: ORL

## 2023-03-02 PROCEDURE — 80053 COMPREHEN METABOLIC PANEL: CPT | Mod: ORL

## 2023-03-02 PROCEDURE — 82306 VITAMIN D 25 HYDROXY: CPT | Mod: ORL

## 2023-03-02 PROCEDURE — 82607 VITAMIN B-12: CPT | Mod: ORL

## 2023-03-04 LAB — DEPRECATED CALCIDIOL+CALCIFEROL SERPL-MC: 42 UG/L (ref 20–75)

## 2023-09-13 ENCOUNTER — LAB REQUISITION (OUTPATIENT)
Dept: LAB | Facility: CLINIC | Age: 88
End: 2023-09-13
Payer: MEDICARE

## 2023-09-13 DIAGNOSIS — E59 DIETARY SELENIUM DEFICIENCY: ICD-10-CM

## 2023-09-13 DIAGNOSIS — I10 ESSENTIAL (PRIMARY) HYPERTENSION: ICD-10-CM

## 2023-09-13 DIAGNOSIS — E53.8 DEFICIENCY OF OTHER SPECIFIED B GROUP VITAMINS: ICD-10-CM

## 2023-09-20 ENCOUNTER — LAB REQUISITION (OUTPATIENT)
Dept: LAB | Facility: CLINIC | Age: 88
End: 2023-09-20
Payer: MEDICARE

## 2023-09-20 DIAGNOSIS — E53.8 DEFICIENCY OF OTHER SPECIFIED B GROUP VITAMINS: ICD-10-CM

## 2023-09-20 DIAGNOSIS — I10 ESSENTIAL (PRIMARY) HYPERTENSION: ICD-10-CM

## 2023-09-20 DIAGNOSIS — E55.9 VITAMIN D DEFICIENCY, UNSPECIFIED: ICD-10-CM

## 2023-09-21 LAB
ALBUMIN SERPL BCG-MCNC: 3.3 G/DL (ref 3.5–5.2)
ALP SERPL-CCNC: 60 U/L (ref 40–129)
ALT SERPL W P-5'-P-CCNC: <5 U/L (ref 0–70)
ANION GAP SERPL CALCULATED.3IONS-SCNC: 13 MMOL/L (ref 7–15)
AST SERPL W P-5'-P-CCNC: 37 U/L (ref 0–45)
BILIRUB SERPL-MCNC: 0.6 MG/DL
BUN SERPL-MCNC: 16.3 MG/DL (ref 8–23)
CALCIUM SERPL-MCNC: 8.9 MG/DL (ref 8.2–9.6)
CHLORIDE SERPL-SCNC: 109 MMOL/L (ref 98–107)
CREAT SERPL-MCNC: 1.09 MG/DL (ref 0.67–1.17)
DEPRECATED HCO3 PLAS-SCNC: 24 MMOL/L (ref 22–29)
EGFRCR SERPLBLD CKD-EPI 2021: 62 ML/MIN/1.73M2
ERYTHROCYTE [DISTWIDTH] IN BLOOD BY AUTOMATED COUNT: 13.4 % (ref 10–15)
GLUCOSE SERPL-MCNC: 88 MG/DL (ref 70–99)
HCT VFR BLD AUTO: 46.8 % (ref 40–53)
HGB BLD-MCNC: 15.1 G/DL (ref 13.3–17.7)
MCH RBC QN AUTO: 32.3 PG (ref 26.5–33)
MCHC RBC AUTO-ENTMCNC: 32.3 G/DL (ref 31.5–36.5)
MCV RBC AUTO: 100 FL (ref 78–100)
PLATELET # BLD AUTO: 191 10E3/UL (ref 150–450)
POTASSIUM SERPL-SCNC: 4.5 MMOL/L (ref 3.4–5.3)
PROT SERPL-MCNC: 6 G/DL (ref 6.4–8.3)
RBC # BLD AUTO: 4.67 10E6/UL (ref 4.4–5.9)
SODIUM SERPL-SCNC: 146 MMOL/L (ref 136–145)
VIT B12 SERPL-MCNC: 627 PG/ML (ref 232–1245)
WBC # BLD AUTO: 3.9 10E3/UL (ref 4–11)

## 2023-09-21 PROCEDURE — 82607 VITAMIN B-12: CPT | Mod: ORL

## 2023-09-21 PROCEDURE — P9604 ONE-WAY ALLOW PRORATED TRIP: HCPCS | Mod: ORL

## 2023-09-21 PROCEDURE — 36415 COLL VENOUS BLD VENIPUNCTURE: CPT | Mod: ORL

## 2023-09-21 PROCEDURE — 80053 COMPREHEN METABOLIC PANEL: CPT | Mod: ORL

## 2023-09-21 PROCEDURE — 85027 COMPLETE CBC AUTOMATED: CPT | Mod: ORL

## 2023-09-21 PROCEDURE — 82306 VITAMIN D 25 HYDROXY: CPT | Mod: ORL

## 2023-09-22 LAB — DEPRECATED CALCIDIOL+CALCIFEROL SERPL-MC: 52 UG/L (ref 20–75)

## 2024-11-11 ENCOUNTER — LAB REQUISITION (OUTPATIENT)
Dept: LAB | Facility: CLINIC | Age: 89
End: 2024-11-11
Payer: MEDICARE

## 2024-11-11 DIAGNOSIS — I10 ESSENTIAL (PRIMARY) HYPERTENSION: ICD-10-CM

## 2024-11-11 DIAGNOSIS — D51.9 VITAMIN B12 DEFICIENCY ANEMIA, UNSPECIFIED: ICD-10-CM

## 2024-11-11 DIAGNOSIS — E55.9 VITAMIN D DEFICIENCY, UNSPECIFIED: ICD-10-CM

## 2024-11-14 LAB
ANION GAP SERPL CALCULATED.3IONS-SCNC: 9 MMOL/L (ref 7–15)
BASOPHILS # BLD AUTO: 0 10E3/UL (ref 0–0.2)
BASOPHILS NFR BLD AUTO: 1 %
BUN SERPL-MCNC: 19.4 MG/DL (ref 8–23)
CALCIUM SERPL-MCNC: 9.1 MG/DL (ref 8.8–10.4)
CHLORIDE SERPL-SCNC: 103 MMOL/L (ref 98–107)
CREAT SERPL-MCNC: 0.94 MG/DL (ref 0.67–1.17)
EGFRCR SERPLBLD CKD-EPI 2021: 73 ML/MIN/1.73M2
EOSINOPHIL # BLD AUTO: 0.1 10E3/UL (ref 0–0.7)
EOSINOPHIL NFR BLD AUTO: 2 %
ERYTHROCYTE [DISTWIDTH] IN BLOOD BY AUTOMATED COUNT: 12.4 % (ref 10–15)
GLUCOSE SERPL-MCNC: 88 MG/DL (ref 70–99)
HCO3 SERPL-SCNC: 25 MMOL/L (ref 22–29)
HCT VFR BLD AUTO: 49.7 % (ref 40–53)
HGB BLD-MCNC: 16.1 G/DL (ref 13.3–17.7)
IMM GRANULOCYTES # BLD: 0 10E3/UL
IMM GRANULOCYTES NFR BLD: 0 %
LYMPHOCYTES # BLD AUTO: 1.4 10E3/UL (ref 0.8–5.3)
LYMPHOCYTES NFR BLD AUTO: 31 %
MCH RBC QN AUTO: 31.9 PG (ref 26.5–33)
MCHC RBC AUTO-ENTMCNC: 32.4 G/DL (ref 31.5–36.5)
MCV RBC AUTO: 98 FL (ref 78–100)
MONOCYTES # BLD AUTO: 0.6 10E3/UL (ref 0–1.3)
MONOCYTES NFR BLD AUTO: 12 %
NEUTROPHILS # BLD AUTO: 2.4 10E3/UL (ref 1.6–8.3)
NEUTROPHILS NFR BLD AUTO: 54 %
NRBC # BLD AUTO: 0 10E3/UL
NRBC BLD AUTO-RTO: 0 /100
PLATELET # BLD AUTO: 169 10E3/UL (ref 150–450)
POTASSIUM SERPL-SCNC: 4.6 MMOL/L (ref 3.4–5.3)
RBC # BLD AUTO: 5.05 10E6/UL (ref 4.4–5.9)
SODIUM SERPL-SCNC: 137 MMOL/L (ref 135–145)
VIT B12 SERPL-MCNC: 656 PG/ML (ref 232–1245)
VIT D+METAB SERPL-MCNC: 81 NG/ML (ref 20–50)
WBC # BLD AUTO: 4.5 10E3/UL (ref 4–11)

## 2024-11-14 PROCEDURE — P9604 ONE-WAY ALLOW PRORATED TRIP: HCPCS | Mod: ORL

## 2024-11-14 PROCEDURE — 36415 COLL VENOUS BLD VENIPUNCTURE: CPT | Mod: ORL

## 2024-11-14 PROCEDURE — 82306 VITAMIN D 25 HYDROXY: CPT | Mod: ORL

## 2024-11-14 PROCEDURE — 80048 BASIC METABOLIC PNL TOTAL CA: CPT | Mod: ORL

## 2024-11-14 PROCEDURE — 85025 COMPLETE CBC W/AUTO DIFF WBC: CPT | Mod: ORL

## 2024-11-14 PROCEDURE — 82607 VITAMIN B-12: CPT | Mod: ORL

## 2025-03-04 ENCOUNTER — DOCUMENTATION ONLY (OUTPATIENT)
Dept: OTHER | Facility: CLINIC | Age: OVER 89
End: 2025-03-04
Payer: MEDICARE